# Patient Record
Sex: FEMALE | Race: WHITE | NOT HISPANIC OR LATINO | Employment: OTHER | URBAN - METROPOLITAN AREA
[De-identification: names, ages, dates, MRNs, and addresses within clinical notes are randomized per-mention and may not be internally consistent; named-entity substitution may affect disease eponyms.]

---

## 2023-05-15 ENCOUNTER — OFFICE VISIT (OUTPATIENT)
Dept: PRIMARY CARE | Facility: CLINIC | Age: 73
End: 2023-05-15
Payer: MEDICARE

## 2023-05-15 VITALS — OXYGEN SATURATION: 97 % | SYSTOLIC BLOOD PRESSURE: 130 MMHG | HEART RATE: 68 BPM | DIASTOLIC BLOOD PRESSURE: 76 MMHG

## 2023-05-15 DIAGNOSIS — R42 VERTIGO: Primary | ICD-10-CM

## 2023-05-15 PROCEDURE — 1159F MED LIST DOCD IN RCRD: CPT | Performed by: INTERNAL MEDICINE

## 2023-05-15 PROCEDURE — 99213 OFFICE O/P EST LOW 20 MIN: CPT | Performed by: INTERNAL MEDICINE

## 2023-05-15 PROCEDURE — 1036F TOBACCO NON-USER: CPT | Performed by: INTERNAL MEDICINE

## 2023-05-15 PROCEDURE — 1157F ADVNC CARE PLAN IN RCRD: CPT | Performed by: INTERNAL MEDICINE

## 2023-05-15 RX ORDER — CALCIUM CARBONATE/VITAMIN D3 500 MG-10
1 TABLET,CHEWABLE ORAL DAILY
COMMUNITY
End: 2023-12-04 | Stop reason: WASHOUT

## 2023-05-15 RX ORDER — EVENING PRIMROSE OIL 500 MG
CAPSULE ORAL
COMMUNITY
Start: 2021-04-01

## 2023-05-15 RX ORDER — ANASTROZOLE 1 MG/1
1 TABLET ORAL DAILY
COMMUNITY
End: 2024-01-22 | Stop reason: SDUPTHER

## 2023-05-15 RX ORDER — ATORVASTATIN CALCIUM 10 MG/1
1 TABLET, FILM COATED ORAL DAILY
COMMUNITY
Start: 2019-07-10 | End: 2023-08-22

## 2023-05-15 RX ORDER — LACTOBACILLUS COMBINATION NO.4 3B CELL
CAPSULE ORAL
COMMUNITY
End: 2023-12-04 | Stop reason: WASHOUT

## 2023-05-15 RX ORDER — ESTRADIOL 0.1 MG/G
CREAM VAGINAL
COMMUNITY
Start: 2022-04-11 | End: 2023-12-04 | Stop reason: SDUPTHER

## 2023-05-15 RX ORDER — VIT C/E/ZN/COPPR/LUTEIN/ZEAXAN 250MG-90MG
CAPSULE ORAL
COMMUNITY

## 2023-05-15 ASSESSMENT — ENCOUNTER SYMPTOMS
OCCASIONAL FEELINGS OF UNSTEADINESS: 1
LOSS OF SENSATION IN FEET: 0
DEPRESSION: 0

## 2023-05-15 NOTE — PROGRESS NOTES
Subjective   Patient ID: Paulina Knutson is a 72 y.o. female who presents for Dizziness.    Patient is here after an episode of vertigo that she had over the weekend.  Patient was in her usual state when she had what sounds like a viral gastroenteritis.  She had intense nausea and vomiting as well as diarrhea.  The entire episode lasted about 3 hours.  During that episode though she became very lightheaded and unsteady on her feet and she felt that she was on the deck of a ship.  The episode resolved and the patient is actually felt quite well since that time.  She denies any headache double vision tinnitus or hearing loss.  Patient had no true room spinning.  Patient had another episode similar to this about 3 months ago and had 1 more episode about a year ago.  She feels otherwise well         Review of Systems    Objective   /76   Pulse 68   SpO2 97%     Physical Exam  HEENT ears are clear bilaterally  EOMI PERRLA no nystagmus noted.  Chest clear to A&P  Cardiac exam unremarkable.    Assessment/Plan   Problem List Items Addressed This Visit          Other    Vertigo - Primary     Episode of vertigo associated with a viral gastroenteritis all of which have resolved.  Patient is reassured no need for any additional imaging at this time.  Patient will call if symptoms do not improve or recur.

## 2023-05-15 NOTE — ASSESSMENT & PLAN NOTE
Episode of vertigo associated with a viral gastroenteritis all of which have resolved.  Patient is reassured no need for any additional imaging at this time.  Patient will call if symptoms do not improve or recur.

## 2023-05-31 ENCOUNTER — OFFICE VISIT (OUTPATIENT)
Dept: PRIMARY CARE | Facility: CLINIC | Age: 73
End: 2023-05-31
Payer: MEDICARE

## 2023-05-31 ENCOUNTER — LAB (OUTPATIENT)
Dept: LAB | Facility: LAB | Age: 73
End: 2023-05-31
Payer: MEDICARE

## 2023-05-31 VITALS — OXYGEN SATURATION: 96 % | SYSTOLIC BLOOD PRESSURE: 110 MMHG | DIASTOLIC BLOOD PRESSURE: 64 MMHG | HEART RATE: 72 BPM

## 2023-05-31 DIAGNOSIS — R11.15 CYCLICAL VOMITING SYNDROME NOT ASSOCIATED WITH MIGRAINE: ICD-10-CM

## 2023-05-31 DIAGNOSIS — R10.84 GENERALIZED ABDOMINAL PAIN: ICD-10-CM

## 2023-05-31 DIAGNOSIS — R10.84 GENERALIZED ABDOMINAL PAIN: Primary | ICD-10-CM

## 2023-05-31 LAB
ALANINE AMINOTRANSFERASE (SGPT) (U/L) IN SER/PLAS: 16 U/L (ref 7–45)
ALBUMIN (G/DL) IN SER/PLAS: 4.3 G/DL (ref 3.4–5)
ALKALINE PHOSPHATASE (U/L) IN SER/PLAS: 72 U/L (ref 33–136)
AMYLASE (U/L) IN SER/PLAS: 57 U/L (ref 29–103)
ANION GAP IN SER/PLAS: 13 MMOL/L (ref 10–20)
ASPARTATE AMINOTRANSFERASE (SGOT) (U/L) IN SER/PLAS: 22 U/L (ref 9–39)
BASOPHILS (10*3/UL) IN BLOOD BY AUTOMATED COUNT: 0.05 X10E9/L (ref 0–0.1)
BASOPHILS/100 LEUKOCYTES IN BLOOD BY AUTOMATED COUNT: 0.4 % (ref 0–2)
BILIRUBIN TOTAL (MG/DL) IN SER/PLAS: 0.7 MG/DL (ref 0–1.2)
CALCIUM (MG/DL) IN SER/PLAS: 9.9 MG/DL (ref 8.6–10.3)
CARBON DIOXIDE, TOTAL (MMOL/L) IN SER/PLAS: 31 MMOL/L (ref 21–32)
CHLORIDE (MMOL/L) IN SER/PLAS: 102 MMOL/L (ref 98–107)
CREATININE (MG/DL) IN SER/PLAS: 0.67 MG/DL (ref 0.5–1.05)
EOSINOPHILS (10*3/UL) IN BLOOD BY AUTOMATED COUNT: 0.01 X10E9/L (ref 0–0.4)
EOSINOPHILS/100 LEUKOCYTES IN BLOOD BY AUTOMATED COUNT: 0.1 % (ref 0–6)
ERYTHROCYTE DISTRIBUTION WIDTH (RATIO) BY AUTOMATED COUNT: 12.7 % (ref 11.5–14.5)
ERYTHROCYTE MEAN CORPUSCULAR HEMOGLOBIN CONCENTRATION (G/DL) BY AUTOMATED: 33.6 G/DL (ref 32–36)
ERYTHROCYTE MEAN CORPUSCULAR VOLUME (FL) BY AUTOMATED COUNT: 92 FL (ref 80–100)
ERYTHROCYTES (10*6/UL) IN BLOOD BY AUTOMATED COUNT: 4.8 X10E12/L (ref 4–5.2)
GFR FEMALE: >90 ML/MIN/1.73M2
GLUCOSE (MG/DL) IN SER/PLAS: 105 MG/DL (ref 74–99)
HEMATOCRIT (%) IN BLOOD BY AUTOMATED COUNT: 44.3 % (ref 36–46)
HEMOGLOBIN (G/DL) IN BLOOD: 14.9 G/DL (ref 12–16)
IMMATURE GRANULOCYTES/100 LEUKOCYTES IN BLOOD BY AUTOMATED COUNT: 0.7 % (ref 0–0.9)
LEUKOCYTES (10*3/UL) IN BLOOD BY AUTOMATED COUNT: 14.1 X10E9/L (ref 4.4–11.3)
LIPASE (U/L) IN SER/PLAS: 47 U/L (ref 9–82)
LYMPHOCYTES (10*3/UL) IN BLOOD BY AUTOMATED COUNT: 0.77 X10E9/L (ref 0.8–3)
LYMPHOCYTES/100 LEUKOCYTES IN BLOOD BY AUTOMATED COUNT: 5.5 % (ref 13–44)
MONOCYTES (10*3/UL) IN BLOOD BY AUTOMATED COUNT: 0.36 X10E9/L (ref 0.05–0.8)
MONOCYTES/100 LEUKOCYTES IN BLOOD BY AUTOMATED COUNT: 2.6 % (ref 2–10)
NEUTROPHILS (10*3/UL) IN BLOOD BY AUTOMATED COUNT: 12.81 X10E9/L (ref 1.6–5.5)
NEUTROPHILS/100 LEUKOCYTES IN BLOOD BY AUTOMATED COUNT: 90.7 % (ref 40–80)
PLATELETS (10*3/UL) IN BLOOD AUTOMATED COUNT: 224 X10E9/L (ref 150–450)
POTASSIUM (MMOL/L) IN SER/PLAS: 4.2 MMOL/L (ref 3.5–5.3)
PROTEIN TOTAL: 6.8 G/DL (ref 6.4–8.2)
SEDIMENTATION RATE, ERYTHROCYTE: 7 MM/H (ref 0–30)
SODIUM (MMOL/L) IN SER/PLAS: 142 MMOL/L (ref 136–145)
UREA NITROGEN (MG/DL) IN SER/PLAS: 17 MG/DL (ref 6–23)
URINE CULTURE: NORMAL

## 2023-05-31 PROCEDURE — 36415 COLL VENOUS BLD VENIPUNCTURE: CPT

## 2023-05-31 PROCEDURE — 82150 ASSAY OF AMYLASE: CPT

## 2023-05-31 PROCEDURE — 1036F TOBACCO NON-USER: CPT | Performed by: INTERNAL MEDICINE

## 2023-05-31 PROCEDURE — 1157F ADVNC CARE PLAN IN RCRD: CPT | Performed by: INTERNAL MEDICINE

## 2023-05-31 PROCEDURE — 83690 ASSAY OF LIPASE: CPT

## 2023-05-31 PROCEDURE — 1159F MED LIST DOCD IN RCRD: CPT | Performed by: INTERNAL MEDICINE

## 2023-05-31 PROCEDURE — 80053 COMPREHEN METABOLIC PANEL: CPT

## 2023-05-31 PROCEDURE — 85652 RBC SED RATE AUTOMATED: CPT

## 2023-05-31 PROCEDURE — 99214 OFFICE O/P EST MOD 30 MIN: CPT | Performed by: INTERNAL MEDICINE

## 2023-05-31 PROCEDURE — 85025 COMPLETE CBC W/AUTO DIFF WBC: CPT

## 2023-05-31 NOTE — PROGRESS NOTES
Subjective   Patient ID: Paulina Knutson is a 72 y.o. female who presents for Dizziness.    Patient is here having recently suffered another episode of dizziness associated with nausea vomiting and loose stools.  This is now the fourth episode the patient had in the last 5 to 6 weeks.  Each episode begins with a feeling of vertigo.  Those symptoms lasted for few minutes and are often also associated with some sensation of having to go to the bathroom and have a bowel movement.  The patient has never had any syncopal or presyncopal symptoms.  She has no flushing or diaphoresis.  The spells last for few minutes and then she eventually gets home and get into bed where she then has episodes of vomiting as well as frequent defecation without true diarrhea.  She also has fairly significant abdominal pain associated with the vomiting.  The episodes last for approximately 2 to 3 hours and then resolves completely.  The patient then feels completely fine and is back to her baseline.  She has no residual does not feel tired or flushed and was able to go out to dinner the night after the episodes.  Patient has no other intervening symptoms when she is not acutely ill.  She has no associated fever chills night sweats or weight loss.  She has not no new medication.  She takes Arimidex and atorvastatin which has been on for years.  She has had no interaction with anybody else who is ill.  She has no history of migraine headache  She has no history of abdominal pain flushing or palpitations.           Review of Systems    Objective   /64   Pulse 72   SpO2 96%     Physical Exam  Abdominal:      General: Abdomen is flat. Bowel sounds are normal. There is no distension.      Palpations: Abdomen is soft. There is no mass.      Tenderness: There is no abdominal tenderness. There is guarding. There is no rebound.      Hernia: No hernia is present.         Assessment/Plan   Problem List Items Addressed This Visit           Nervous    Generalized abdominal pain - Primary    Relevant Orders    CBC and Auto Differential    Amylase    Comprehensive Metabolic Panel    Sedimentation Rate    Lipase       Digestive    Cyclical vomiting syndrome not associated with migraine     Unusual syndrome of cyclic nausea vomiting and loose stool associated with some vertigo.  Patient does not appear to have abdominal migraine but cannot rule out the possibility of some type of cyclic vomiting syndrome versus some other GI process or food intolerance.  We will obtain a CBC sed rate Panel amylase and lipase and then pursue further work-up as needed.  Nothing to suggest flushing or palpitations to consider carcinoid syndrome  No history of lead exposure or arsenic exposure

## 2023-05-31 NOTE — ASSESSMENT & PLAN NOTE
Unusual syndrome of cyclic nausea vomiting and loose stool associated with some vertigo.  Patient does not appear to have abdominal migraine but cannot rule out the possibility of some type of cyclic vomiting syndrome versus some other GI process or food intolerance.  We will obtain a CBC sed rate Panel amylase and lipase and then pursue further work-up as needed.  Nothing to suggest flushing or palpitations to consider carcinoid syndrome  No history of lead exposure or arsenic exposure

## 2023-07-06 ENCOUNTER — LAB (OUTPATIENT)
Dept: LAB | Facility: LAB | Age: 73
End: 2023-07-06
Payer: MEDICARE

## 2023-07-06 DIAGNOSIS — N30.00 ACUTE CYSTITIS WITHOUT HEMATURIA: Primary | ICD-10-CM

## 2023-07-06 DIAGNOSIS — R35.0 URINARY FREQUENCY: ICD-10-CM

## 2023-07-06 LAB
APPEARANCE, URINE: ABNORMAL
BILIRUBIN, URINE: NEGATIVE
BLOOD, URINE: ABNORMAL
COLOR, URINE: YELLOW
GLUCOSE, URINE: NEGATIVE MG/DL
KETONES, URINE: NEGATIVE MG/DL
LEUKOCYTE ESTERASE, URINE: ABNORMAL
NITRITE, URINE: NEGATIVE
PH, URINE: 6 (ref 5–8)
PROTEIN, URINE: ABNORMAL MG/DL
RBC, URINE: 108 /HPF (ref 0–5)
SPECIFIC GRAVITY, URINE: 1.01 (ref 1–1.03)
UROBILINOGEN, URINE: <2 MG/DL (ref 0–1.9)
WBC CLUMPS, URINE: ABNORMAL /HPF
WBC, URINE: 3353 /HPF (ref 0–5)

## 2023-07-06 PROCEDURE — 87077 CULTURE AEROBIC IDENTIFY: CPT

## 2023-07-06 PROCEDURE — 87086 URINE CULTURE/COLONY COUNT: CPT

## 2023-07-06 PROCEDURE — 81001 URINALYSIS AUTO W/SCOPE: CPT

## 2023-07-06 PROCEDURE — 87186 SC STD MICRODIL/AGAR DIL: CPT

## 2023-07-06 RX ORDER — SULFAMETHOXAZOLE AND TRIMETHOPRIM 800; 160 MG/1; MG/1
1 TABLET ORAL 2 TIMES DAILY
Qty: 6 TABLET | Refills: 0 | Status: SHIPPED | OUTPATIENT
Start: 2023-07-06 | End: 2023-07-09

## 2023-07-08 LAB — URINE CULTURE: ABNORMAL

## 2023-08-22 DIAGNOSIS — E78.2 MIXED HYPERLIPIDEMIA: Primary | ICD-10-CM

## 2023-08-22 RX ORDER — ATORVASTATIN CALCIUM 10 MG/1
10 TABLET, FILM COATED ORAL DAILY
Qty: 90 TABLET | Refills: 1 | Status: SHIPPED | OUTPATIENT
Start: 2023-08-22 | End: 2024-01-23 | Stop reason: SDUPTHER

## 2023-09-01 LAB
ALLERGEN FOOD: FISH (COD) GADUS MORHUA) IGE (KU/L): <0.1 KU/L
ALLERGEN FOOD: LOBSTER (HOMARUS GAMMARUS) IGE (KU/L): <0.1 KU/L
ALLERGEN FOOD: SALMON (SALMO SALAR) IGE (KU/L): <0.1 KU/L
ALLERGEN FOOD: SCALLOP (PECTEN SPP.) IGE (KU/L): <0.1 KU/L
ALLERGEN FOOD: SHRIMP (P. BOREALIS/MONODON, M. BARBATA/JOYNERI) IGE (KU/L): <0.1 KU/L
IMMUNOCAP INTERPRETATION: NORMAL

## 2023-09-04 LAB
IMMUNOCAP INTERPRETATION (ARUP): NORMAL
Lab: <0.1 KU/L

## 2023-09-06 LAB — TRYPTASE: 6.3 MCG/L

## 2023-09-08 LAB — F065 PERCH IGE: <0.35 KU/L

## 2023-11-16 DIAGNOSIS — Z00.00 ANNUAL PHYSICAL EXAM: ICD-10-CM

## 2023-11-17 PROBLEM — M81.8 DISUSE OSTEOPOROSIS: Status: ACTIVE | Noted: 2023-11-17

## 2023-11-17 PROBLEM — D22.70 MELANOCYTIC NEVI OF UNSPECIFIED LOWER LIMB, INCLUDING HIP: Status: ACTIVE | Noted: 2023-08-07

## 2023-11-17 PROBLEM — D48.5 NEOPLASM OF UNCERTAIN BEHAVIOR OF SKIN: Status: ACTIVE | Noted: 2023-08-07

## 2023-11-17 PROBLEM — L90.5 SCAR CONDITION AND FIBROSIS OF SKIN: Status: ACTIVE | Noted: 2023-08-07

## 2023-11-17 PROBLEM — D22.60 MELANOCYTIC NEVI OF UNSPECIFIED UPPER LIMB, INCLUDING SHOULDER: Status: ACTIVE | Noted: 2023-08-07

## 2023-11-17 PROBLEM — H91.20 SUDDEN IDIOPATHIC HEARING LOSS: Status: ACTIVE | Noted: 2022-08-11

## 2023-11-17 PROBLEM — R30.0 DYSURIA: Status: ACTIVE | Noted: 2023-11-17

## 2023-11-17 PROBLEM — D22.39 MELANOCYTIC NEVI OF OTHER PARTS OF FACE: Status: ACTIVE | Noted: 2023-08-07

## 2023-11-17 PROBLEM — H90.3 ASYMMETRICAL SENSORINEURAL HEARING LOSS: Status: ACTIVE | Noted: 2021-12-21

## 2023-11-17 PROBLEM — R92.8 ABNORMAL MAMMOGRAM: Status: ACTIVE | Noted: 2023-11-17

## 2023-11-17 PROBLEM — C50.912 BREAST CANCER, LEFT (MULTI): Status: ACTIVE | Noted: 2023-11-17

## 2023-11-17 PROBLEM — J02.9 SORE THROAT: Status: ACTIVE | Noted: 2023-11-17

## 2023-11-17 PROBLEM — D22.5 MELANOCYTIC NEVI OF TRUNK: Status: ACTIVE | Noted: 2023-08-07

## 2023-11-17 PROBLEM — J06.9 ACUTE URI: Status: ACTIVE | Noted: 2023-11-17

## 2023-11-17 PROBLEM — M85.80 OSTEOPENIA: Status: ACTIVE | Noted: 2023-11-17

## 2023-11-17 PROBLEM — N39.0 RECURRENT UTI: Status: ACTIVE | Noted: 2023-11-17

## 2023-11-17 PROBLEM — R39.9 URINARY SYMPTOM OR SIGN: Status: ACTIVE | Noted: 2023-11-17

## 2023-11-17 PROBLEM — N39.0 URINARY TRACT INFECTION: Status: ACTIVE | Noted: 2023-11-17

## 2023-11-17 PROBLEM — G93.9 BRAIN STEM LESION: Status: ACTIVE | Noted: 2023-11-17

## 2023-11-17 PROBLEM — H90.3 SENSORINEURAL HEARING LOSS, BILATERAL: Status: ACTIVE | Noted: 2021-12-21

## 2023-11-17 PROBLEM — N95.2 ATROPHY OF VAGINA: Status: ACTIVE | Noted: 2023-11-17

## 2023-11-17 PROBLEM — A49.9 BACTERIAL UTI: Status: ACTIVE | Noted: 2023-11-17

## 2023-11-17 PROBLEM — E78.5 HYPERLIPIDEMIA: Status: ACTIVE | Noted: 2023-11-17

## 2023-11-17 PROBLEM — D18.01 HEMANGIOMA OF SKIN AND SUBCUTANEOUS TISSUE: Status: ACTIVE | Noted: 2023-08-07

## 2023-11-17 PROBLEM — C50.919 INVASIVE DUCTAL CARCINOMA OF BREAST (MULTI): Status: ACTIVE | Noted: 2023-11-17

## 2023-11-17 PROBLEM — N63.20 LEFT BREAST MASS: Status: ACTIVE | Noted: 2023-11-17

## 2023-11-17 PROBLEM — R92.30 DENSE BREAST TISSUE: Status: ACTIVE | Noted: 2023-11-17

## 2023-11-17 PROBLEM — D23.72 OTHER BENIGN NEOPLASM OF SKIN OF LEFT LOWER LIMB, INCLUDING HIP: Status: ACTIVE | Noted: 2023-08-07

## 2023-11-17 PROBLEM — H93.299 IMPAIRED AUDITORY DISCRIMINATION: Status: ACTIVE | Noted: 2021-12-21

## 2023-11-17 PROBLEM — D12.6 COLON ADENOMA: Status: ACTIVE | Noted: 2023-11-17

## 2023-11-17 PROBLEM — L81.4 OTHER MELANIN HYPERPIGMENTATION: Status: ACTIVE | Noted: 2023-08-07

## 2023-11-17 PROBLEM — N39.0 BACTERIAL UTI: Status: ACTIVE | Noted: 2023-11-17

## 2023-11-17 PROBLEM — E78.01 FAMILIAL HYPERCHOLESTEREMIA: Status: ACTIVE | Noted: 2023-11-17

## 2023-11-17 PROBLEM — N64.89 SEROMA OF BREAST: Status: ACTIVE | Noted: 2023-11-17

## 2023-11-17 PROBLEM — D23.9 OTHER BENIGN NEOPLASM OF SKIN, UNSPECIFIED: Status: ACTIVE | Noted: 2023-08-07

## 2023-11-17 PROBLEM — L82.1 OTHER SEBORRHEIC KERATOSIS: Status: ACTIVE | Noted: 2023-08-07

## 2023-11-17 PROBLEM — R82.90 ABNORMAL URINE FINDINGS: Status: ACTIVE | Noted: 2023-11-17

## 2023-11-17 PROBLEM — R93.0 ABNORMAL MAGNETIC RESONANCE IMAGING OF HEAD: Status: ACTIVE | Noted: 2022-01-10

## 2023-11-17 PROBLEM — L73.8 OTHER SPECIFIED FOLLICULAR DISORDERS: Status: ACTIVE | Noted: 2023-08-07

## 2023-11-17 PROBLEM — Z90.12 STATUS POST PARTIAL MASTECTOMY OF LEFT BREAST: Status: ACTIVE | Noted: 2023-11-17

## 2023-11-17 PROBLEM — Z98.890 STATUS POST LEFT BREAST LUMPECTOMY: Status: ACTIVE | Noted: 2023-11-17

## 2023-11-17 RX ORDER — CEPHALEXIN 250 MG/1
250 CAPSULE ORAL
COMMUNITY
End: 2023-12-04 | Stop reason: WASHOUT

## 2023-11-17 RX ORDER — SULFAMETHOXAZOLE AND TRIMETHOPRIM 800; 160 MG/1; MG/1
1 TABLET ORAL EVERY 12 HOURS
COMMUNITY
End: 2023-12-04 | Stop reason: WASHOUT

## 2023-11-17 RX ORDER — ANASTROZOLE 1 MG/1
1 TABLET ORAL DAILY
COMMUNITY
End: 2023-12-04 | Stop reason: WASHOUT

## 2023-11-17 RX ORDER — PREDNISONE 5 MG/1
5 TABLET ORAL DAILY
COMMUNITY
End: 2023-12-04 | Stop reason: WASHOUT

## 2023-11-17 RX ORDER — CIPROFLOXACIN 250 MG/1
250 TABLET, FILM COATED ORAL 2 TIMES DAILY
COMMUNITY
End: 2023-12-04 | Stop reason: WASHOUT

## 2023-11-17 RX ORDER — NITROFURANTOIN 25; 75 MG/1; MG/1
1 CAPSULE ORAL
COMMUNITY
End: 2023-12-04 | Stop reason: WASHOUT

## 2023-11-17 RX ORDER — NIRMATRELVIR AND RITONAVIR 300-100 MG
KIT ORAL
COMMUNITY
End: 2023-12-04 | Stop reason: WASHOUT

## 2023-11-17 RX ORDER — AMOXICILLIN AND CLAVULANATE POTASSIUM 875; 125 MG/1; MG/1
1 TABLET, FILM COATED ORAL 2 TIMES DAILY
COMMUNITY
End: 2023-12-04 | Stop reason: WASHOUT

## 2023-11-17 RX ORDER — POLYETHYLENE GLYCOL 3350, SODIUM CHLORIDE, SODIUM BICARBONATE, POTASSIUM CHLORIDE 420; 11.2; 5.72; 1.48 G/4L; G/4L; G/4L; G/4L
4000 POWDER, FOR SOLUTION ORAL ONCE
COMMUNITY
End: 2023-12-04 | Stop reason: WASHOUT

## 2023-11-17 RX ORDER — EPINEPHRINE 0.3 MG/.3ML
1 INJECTION SUBCUTANEOUS ONCE AS NEEDED
COMMUNITY
Start: 2023-08-31

## 2023-11-17 RX ORDER — CIPROFLOXACIN 500 MG/1
500 TABLET ORAL 2 TIMES DAILY
COMMUNITY
End: 2023-12-04 | Stop reason: WASHOUT

## 2023-11-21 ENCOUNTER — CLINICAL SUPPORT (OUTPATIENT)
Dept: AUDIOLOGY | Facility: CLINIC | Age: 73
End: 2023-11-21
Payer: MEDICARE

## 2023-11-21 DIAGNOSIS — H90.A21 SENSORINEURAL HEARING LOSS (SNHL) OF RIGHT EAR WITH RESTRICTED HEARING OF LEFT EAR: Primary | ICD-10-CM

## 2023-11-27 ENCOUNTER — LAB (OUTPATIENT)
Dept: LAB | Facility: LAB | Age: 73
End: 2023-11-27
Payer: MEDICARE

## 2023-11-27 DIAGNOSIS — Z00.00 ANNUAL PHYSICAL EXAM: ICD-10-CM

## 2023-11-27 LAB
25(OH)D3 SERPL-MCNC: 39 NG/ML (ref 30–100)
ALBUMIN SERPL BCP-MCNC: 4.2 G/DL (ref 3.4–5)
ALP SERPL-CCNC: 70 U/L (ref 33–136)
ALT SERPL W P-5'-P-CCNC: 11 U/L (ref 7–45)
ANION GAP SERPL CALC-SCNC: 12 MMOL/L (ref 10–20)
APPEARANCE UR: ABNORMAL
AST SERPL W P-5'-P-CCNC: 18 U/L (ref 9–39)
BASOPHILS # BLD AUTO: 0.06 X10*3/UL (ref 0–0.1)
BASOPHILS NFR BLD AUTO: 1 %
BILIRUB SERPL-MCNC: 0.9 MG/DL (ref 0–1.2)
BILIRUB UR STRIP.AUTO-MCNC: NEGATIVE MG/DL
BUN SERPL-MCNC: 20 MG/DL (ref 6–23)
CALCIUM SERPL-MCNC: 9.8 MG/DL (ref 8.6–10.6)
CHLORIDE SERPL-SCNC: 106 MMOL/L (ref 98–107)
CHOLEST SERPL-MCNC: 149 MG/DL (ref 0–199)
CHOLESTEROL/HDL RATIO: 2.8
CO2 SERPL-SCNC: 32 MMOL/L (ref 21–32)
COLOR UR: YELLOW
CREAT SERPL-MCNC: 0.84 MG/DL (ref 0.5–1.05)
EOSINOPHIL # BLD AUTO: 0.1 X10*3/UL (ref 0–0.4)
EOSINOPHIL NFR BLD AUTO: 1.7 %
ERYTHROCYTE [DISTWIDTH] IN BLOOD BY AUTOMATED COUNT: 12.7 % (ref 11.5–14.5)
EST. AVERAGE GLUCOSE BLD GHB EST-MCNC: 108 MG/DL
GFR SERPL CREATININE-BSD FRML MDRD: 73 ML/MIN/1.73M*2
GLUCOSE SERPL-MCNC: 105 MG/DL (ref 74–99)
GLUCOSE UR STRIP.AUTO-MCNC: NEGATIVE MG/DL
HBA1C MFR BLD: 5.4 %
HCT VFR BLD AUTO: 45.5 % (ref 36–46)
HDLC SERPL-MCNC: 53.3 MG/DL
HGB BLD-MCNC: 14.7 G/DL (ref 12–16)
IMM GRANULOCYTES # BLD AUTO: 0.01 X10*3/UL (ref 0–0.5)
IMM GRANULOCYTES NFR BLD AUTO: 0.2 % (ref 0–0.9)
KETONES UR STRIP.AUTO-MCNC: NEGATIVE MG/DL
LDLC SERPL CALC-MCNC: 76 MG/DL
LEUKOCYTE ESTERASE UR QL STRIP.AUTO: ABNORMAL
LYMPHOCYTES # BLD AUTO: 1.55 X10*3/UL (ref 0.8–3)
LYMPHOCYTES NFR BLD AUTO: 26.9 %
MCH RBC QN AUTO: 32.5 PG (ref 26–34)
MCHC RBC AUTO-ENTMCNC: 32.3 G/DL (ref 32–36)
MCV RBC AUTO: 100 FL (ref 80–100)
MONOCYTES # BLD AUTO: 0.5 X10*3/UL (ref 0.05–0.8)
MONOCYTES NFR BLD AUTO: 8.7 %
MUCOUS THREADS #/AREA URNS AUTO: NORMAL /LPF
NEUTROPHILS # BLD AUTO: 3.55 X10*3/UL (ref 1.6–5.5)
NEUTROPHILS NFR BLD AUTO: 61.5 %
NITRITE UR QL STRIP.AUTO: NEGATIVE
NON HDL CHOLESTEROL: 96 MG/DL (ref 0–149)
NRBC BLD-RTO: 0 /100 WBCS (ref 0–0)
PH UR STRIP.AUTO: 6 [PH]
PLATELET # BLD AUTO: 211 X10*3/UL (ref 150–450)
POTASSIUM SERPL-SCNC: 4.5 MMOL/L (ref 3.5–5.3)
PROT SERPL-MCNC: 6.1 G/DL (ref 6.4–8.2)
PROT UR STRIP.AUTO-MCNC: NEGATIVE MG/DL
RBC # BLD AUTO: 4.53 X10*6/UL (ref 4–5.2)
RBC # UR STRIP.AUTO: NEGATIVE /UL
RBC #/AREA URNS AUTO: NORMAL /HPF
SODIUM SERPL-SCNC: 145 MMOL/L (ref 136–145)
SP GR UR STRIP.AUTO: 1.01
SQUAMOUS #/AREA URNS AUTO: NORMAL /HPF
T4 FREE SERPL-MCNC: 0.91 NG/DL (ref 0.78–1.48)
TRIGL SERPL-MCNC: 99 MG/DL (ref 0–149)
TSH SERPL-ACNC: 4.17 MIU/L (ref 0.44–3.98)
UROBILINOGEN UR STRIP.AUTO-MCNC: <2 MG/DL
VLDL: 20 MG/DL (ref 0–40)
WBC # BLD AUTO: 5.8 X10*3/UL (ref 4.4–11.3)
WBC #/AREA URNS AUTO: NORMAL /HPF

## 2023-11-27 PROCEDURE — 36415 COLL VENOUS BLD VENIPUNCTURE: CPT

## 2023-11-27 PROCEDURE — 80061 LIPID PANEL: CPT

## 2023-11-27 PROCEDURE — 83036 HEMOGLOBIN GLYCOSYLATED A1C: CPT

## 2023-11-27 PROCEDURE — 84443 ASSAY THYROID STIM HORMONE: CPT

## 2023-11-27 PROCEDURE — 85025 COMPLETE CBC W/AUTO DIFF WBC: CPT

## 2023-11-27 PROCEDURE — 82306 VITAMIN D 25 HYDROXY: CPT

## 2023-11-27 PROCEDURE — 84439 ASSAY OF FREE THYROXINE: CPT

## 2023-11-27 PROCEDURE — 80053 COMPREHEN METABOLIC PANEL: CPT

## 2023-11-27 PROCEDURE — 81001 URINALYSIS AUTO W/SCOPE: CPT

## 2023-11-30 NOTE — PROGRESS NOTES
HEARING AID CHECK    Name:  Paulina Knutson  :  1950  Age:  73 y.o.    HEARING AID INFORMATION:    Right Hearing Aid  Oticon CROS PX miniRITE-R  SN: [id number]  Warranty: 2/10/2025  Left Hearing Aid  Oticon More 1 miniRITE-R  SN: [id number]  Warranty: 2/10/2025    SN: [id number]  ConnectClip  SN: [id number]  Warranty: 2024   Length   Right: 1(60)  Left: 1(85)  Dome/Earmold  Right: 6 mm open bass  Left: 6 mm open bass  Wax Filter  ProWax miniFIT  Battery Size  Rechargeable  TLC Expiration:  2/10/2026    HISTORY:    Patient was seen for a check of the above devices.  She was having difficulty connecting to the  chapis, since the Oticon on chapis has been discontinued.  She also had some difficulty with the connect clip.    EXAM/PROCEDURES:    Cleaned and checked hearing aids.  Vacuumed davi ports and  ports. Cleaned battery contacts. Placed in aurovac drying chamber. Replaced wax filters and domes. Final listening check indicated adequate sound quality and function with no distortion of internal feedback.  Connected hearing aids to fitting software.  Completed firmware update.    Successfully unpaired and repaired hearing aid to patient's phone and Oticon  chapis.  Reviewed with patient features of the chapis and similarities/differences from the Oticon on chapis.  Reviewed with patient how to use the connect clip, as well as using the remote microphone feature through the chapis.  Both cannot be used simultaneously, but she may use the remote microphone feature if the connect clip is out of battery or she does not have it with her.    Patient also inquired about any updates in technology.  Informed her that there is a newer generation (Real), which is compatible with her CROS hearing aid so she would only have to update the left hearing aid side.  Offered to patient letting her demo a Real device at a future appointment. She stated she would think about it.    RECOMMENDATIONS  AND FOLLOW-UP:    - Continue use of amplification and follow-up as recommended for hearing aid maintenance and adjustments.  - Recommended 6-month follow-up HAC. Patient to contact the office sooner if problems arise.  - Monitor and recheck hearing as warranted.  - Counseled regarding results and recommendations.      Marah Palmer  Clinical Audiologist

## 2023-12-04 ENCOUNTER — OFFICE VISIT (OUTPATIENT)
Dept: PRIMARY CARE | Facility: CLINIC | Age: 73
End: 2023-12-04
Payer: MEDICARE

## 2023-12-04 ENCOUNTER — OFFICE VISIT (OUTPATIENT)
Dept: OBSTETRICS AND GYNECOLOGY | Facility: CLINIC | Age: 73
End: 2023-12-04
Payer: MEDICARE

## 2023-12-04 VITALS
BODY MASS INDEX: 20.02 KG/M2 | HEIGHT: 65 IN | SYSTOLIC BLOOD PRESSURE: 130 MMHG | DIASTOLIC BLOOD PRESSURE: 86 MMHG | OXYGEN SATURATION: 98 % | HEART RATE: 58 BPM | WEIGHT: 120.15 LBS

## 2023-12-04 VITALS
BODY MASS INDEX: 19.18 KG/M2 | HEART RATE: 67 BPM | HEIGHT: 66 IN | WEIGHT: 119.36 LBS | DIASTOLIC BLOOD PRESSURE: 81 MMHG | SYSTOLIC BLOOD PRESSURE: 144 MMHG

## 2023-12-04 DIAGNOSIS — N39.0 ACUTE LOWER UTI: ICD-10-CM

## 2023-12-04 DIAGNOSIS — C50.912 MALIGNANT NEOPLASM OF LEFT BREAST IN FEMALE, ESTROGEN RECEPTOR POSITIVE, UNSPECIFIED SITE OF BREAST (MULTI): ICD-10-CM

## 2023-12-04 DIAGNOSIS — N39.0 RECURRENT UTI (URINARY TRACT INFECTION): Primary | ICD-10-CM

## 2023-12-04 DIAGNOSIS — Z17.0 MALIGNANT NEOPLASM OF LEFT BREAST IN FEMALE, ESTROGEN RECEPTOR POSITIVE, UNSPECIFIED SITE OF BREAST (MULTI): ICD-10-CM

## 2023-12-04 DIAGNOSIS — N95.2 VAGINAL ATROPHY: ICD-10-CM

## 2023-12-04 DIAGNOSIS — G93.9 BRAIN STEM LESION: ICD-10-CM

## 2023-12-04 DIAGNOSIS — H90.3 SENSORINEURAL HEARING LOSS, BILATERAL: ICD-10-CM

## 2023-12-04 DIAGNOSIS — E78.01 FAMILIAL HYPERCHOLESTEREMIA: Primary | ICD-10-CM

## 2023-12-04 DIAGNOSIS — M81.8 DISUSE OSTEOPOROSIS: ICD-10-CM

## 2023-12-04 PROCEDURE — 99214 OFFICE O/P EST MOD 30 MIN: CPT | Mod: PO | Performed by: OBSTETRICS & GYNECOLOGY

## 2023-12-04 PROCEDURE — 1126F AMNT PAIN NOTED NONE PRSNT: CPT | Performed by: INTERNAL MEDICINE

## 2023-12-04 PROCEDURE — 1159F MED LIST DOCD IN RCRD: CPT | Performed by: OBSTETRICS & GYNECOLOGY

## 2023-12-04 PROCEDURE — 1036F TOBACCO NON-USER: CPT | Performed by: OBSTETRICS & GYNECOLOGY

## 2023-12-04 PROCEDURE — 1126F AMNT PAIN NOTED NONE PRSNT: CPT | Performed by: OBSTETRICS & GYNECOLOGY

## 2023-12-04 PROCEDURE — 1036F TOBACCO NON-USER: CPT | Performed by: INTERNAL MEDICINE

## 2023-12-04 PROCEDURE — 81003 URINALYSIS AUTO W/O SCOPE: CPT | Performed by: OBSTETRICS & GYNECOLOGY

## 2023-12-04 PROCEDURE — 51798 US URINE CAPACITY MEASURE: CPT | Mod: PO | Performed by: OBSTETRICS & GYNECOLOGY

## 2023-12-04 PROCEDURE — 1159F MED LIST DOCD IN RCRD: CPT | Performed by: INTERNAL MEDICINE

## 2023-12-04 PROCEDURE — UHSPHYS PR UH SELECT PHYSICAL: Performed by: INTERNAL MEDICINE

## 2023-12-04 PROCEDURE — 99214 OFFICE O/P EST MOD 30 MIN: CPT | Performed by: OBSTETRICS & GYNECOLOGY

## 2023-12-04 PROCEDURE — 51798 US URINE CAPACITY MEASURE: CPT | Performed by: OBSTETRICS & GYNECOLOGY

## 2023-12-04 RX ORDER — ESTRADIOL 0.1 MG/G
1 CREAM VAGINAL 2 TIMES WEEKLY
Qty: 42.5 G | Refills: 3 | Status: SHIPPED | OUTPATIENT
Start: 2023-12-04

## 2023-12-04 RX ORDER — SULFAMETHOXAZOLE AND TRIMETHOPRIM 800; 160 MG/1; MG/1
1 TABLET ORAL 2 TIMES DAILY
Qty: 14 TABLET | Refills: 1 | Status: SHIPPED | OUTPATIENT
Start: 2023-12-04 | End: 2023-12-04 | Stop reason: WASHOUT

## 2023-12-04 ASSESSMENT — ENCOUNTER SYMPTOMS
NERVOUS/ANXIOUS: 0
WHEEZING: 0
SHORTNESS OF BREATH: 0
CONFUSION: 0
HEADACHES: 0
ADENOPATHY: 0
FREQUENCY: 0
ABDOMINAL DISTENTION: 0
PALPITATIONS: 0
FACIAL SWELLING: 0
FEVER: 0
CHILLS: 0
MYALGIAS: 0
POLYDIPSIA: 0
DYSURIA: 0
SORE THROAT: 0
ABDOMINAL PAIN: 0
COUGH: 0
SINUS PRESSURE: 0
TROUBLE SWALLOWING: 0
SINUS PAIN: 0
LIGHT-HEADEDNESS: 0
FATIGUE: 0
NECK STIFFNESS: 0
BLOOD IN STOOL: 0
NUMBNESS: 0
DIARRHEA: 0
HEMATURIA: 0
RHINORRHEA: 0
UNEXPECTED WEIGHT CHANGE: 0
WEAKNESS: 0
HYPERACTIVE: 0
CONSTITUTIONAL NEGATIVE: 1
CHEST TIGHTNESS: 0
NAUSEA: 0
CONSTIPATION: 0
BRUISES/BLEEDS EASILY: 0
DIZZINESS: 0
SLEEP DISTURBANCE: 0
DYSPHORIC MOOD: 0
JOINT SWELLING: 0
ACTIVITY CHANGE: 0
VOMITING: 0
ARTHRALGIAS: 0
BACK PAIN: 0

## 2023-12-04 ASSESSMENT — PATIENT HEALTH QUESTIONNAIRE - PHQ9
2. FEELING DOWN, DEPRESSED OR HOPELESS: NOT AT ALL
SUM OF ALL RESPONSES TO PHQ9 QUESTIONS 1 AND 2: 0
1. LITTLE INTEREST OR PLEASURE IN DOING THINGS: NOT AT ALL

## 2023-12-04 ASSESSMENT — PAIN SCALES - GENERAL: PAINLEVEL: 0-NO PAIN

## 2023-12-04 NOTE — ASSESSMENT & PLAN NOTE
Repeat MRIs have demonstrated a benign vascular lesion in the asher no further work-up will be required.

## 2023-12-04 NOTE — ASSESSMENT & PLAN NOTE
Patient continues on calcium and vitamin D as well as anastrozole.  We will get a repeat bone density study to assess her current bone status.  Once again encouraged her to do more weight bearing exercise.

## 2023-12-04 NOTE — PROGRESS NOTES
Paulina Knutson       Patient is here for complete physical and a general checkup.  She is doing extremely well without any major complaints.  She is now 2 years status post left lumpectomy and radiation for stage I breast cancer.  She continues to follow-up with the breast team and she remains on anastrozole.  She recently got some new hearing aids.  She continues to exercise regularly does a lot of weight training and water exercise.  She is not doing anything in terms of balance or core strength.           Review of Systems   Constitutional: Negative.  Negative for activity change, chills, fatigue, fever and unexpected weight change.   HENT:  Positive for hearing loss. Negative for dental problem, ear pain, facial swelling, mouth sores, rhinorrhea, sinus pressure, sinus pain, sore throat, tinnitus and trouble swallowing.    Eyes:  Negative for visual disturbance.   Respiratory:  Negative for cough, chest tightness, shortness of breath and wheezing.    Cardiovascular:  Negative for chest pain, palpitations and leg swelling.   Gastrointestinal:  Negative for abdominal distention, abdominal pain, blood in stool, constipation (Mild   managed with Dried fruit), diarrhea, nausea and vomiting.   Endocrine: Negative for cold intolerance, heat intolerance, polydipsia and polyuria.   Genitourinary:  Negative for dysuria, frequency, hematuria and urgency.   Musculoskeletal:  Negative for arthralgias, back pain, joint swelling, myalgias and neck stiffness.   Skin:  Negative for rash.   Allergic/Immunologic: Negative for food allergies.   Neurological:  Negative for dizziness, weakness, light-headedness, numbness and headaches.   Hematological:  Negative for adenopathy. Does not bruise/bleed easily.   Psychiatric/Behavioral:  Negative for confusion, dysphoric mood and sleep disturbance. The patient is not nervous/anxious and is not hyperactive.           Objective      Visit Vitals  /86   Pulse 58   Ht 1.659 m (5'  "5.32\")   Wt 54.5 kg (120 lb 2.4 oz)   SpO2 98%   BMI 19.80 kg/m²   Smoking Status Never   BSA 1.58 m²        Physical Exam  Constitutional:       Appearance: Normal appearance. She is normal weight.   HENT:      Head: Normocephalic and atraumatic.      Right Ear: Tympanic membrane, ear canal and external ear normal.      Left Ear: Tympanic membrane, ear canal and external ear normal.      Nose: Nose normal.      Mouth/Throat:      Mouth: Mucous membranes are moist. No oral lesions.      Pharynx: Oropharynx is clear. Uvula midline. No oropharyngeal exudate or posterior oropharyngeal erythema.   Neck:      Thyroid: No thyroid mass or thyromegaly.      Vascular: No carotid bruit or JVD.   Cardiovascular:      Rate and Rhythm: Normal rate and regular rhythm.      Pulses: Normal pulses.           Carotid pulses are 2+ on the right side and 2+ on the left side.       Radial pulses are 2+ on the right side and 2+ on the left side.        Femoral pulses are 2+ on the right side and 2+ on the left side.       Popliteal pulses are 2+ on the right side and 2+ on the left side.        Dorsalis pedis pulses are 2+ on the right side and 2+ on the left side.        Posterior tibial pulses are 2+ on the right side and 2+ on the left side.      Heart sounds: Normal heart sounds, S1 normal and S2 normal. No murmur heard.  Pulmonary:      Effort: Pulmonary effort is normal.      Breath sounds: Normal breath sounds.   Chest:   Breasts:     Right: Normal. No mass or nipple discharge.      Left: Normal. No mass or nipple discharge.          Comments: Radiation markers on the left breast  Abdominal:      General: Abdomen is flat. Bowel sounds are normal.      Palpations: Abdomen is soft.      Tenderness: There is no abdominal tenderness.      Hernia: No hernia is present.   Musculoskeletal:         General: No swelling or tenderness. Normal range of motion.      Cervical back: Normal range of motion and neck supple. No rigidity.      " Right lower leg: No edema.      Left lower leg: No edema.   Lymphadenopathy:      Cervical: No cervical adenopathy.   Skin:     General: Skin is warm and dry.      Findings: No lesion or rash.   Neurological:      General: No focal deficit present.      Mental Status: She is alert and oriented to person, place, and time. Mental status is at baseline.   Psychiatric:         Mood and Affect: Mood normal.         Thought Content: Thought content normal.              Assess/Plan SmartLinks:   Problem List Items Addressed This Visit             ICD-10-CM    Sensorineural hearing loss, bilateral H90.3     New hearing aids now working quite well.         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  We will get a repeat bone density study to assess her current bone status.  Once again encouraged her to do more weight bearing exercise.         Familial hypercholesteremia - Primary E78.01     Lipids under excellent control on current doses of atorvastatin.        Patient is up-to-date on all vaccines  Patient up-to-date on colon cancer screening.

## 2023-12-04 NOTE — ASSESSMENT & PLAN NOTE
Patient now 2 years status post lumpectomy and radiation doing very well no evidence of recurrence she is follow-up with the breast surgery team every 6 months.

## 2023-12-04 NOTE — PROGRESS NOTES
Urogynecology  Provider:  Emma Quiroz MD  [phone]              ASSESSMENT AND PLAN:     Problem List Items Addressed This Visit    None          I spent a total of eConsult Time: 30 minutes in face to face and non face to face time.        Emma Quiroz MD  HISTORY OF PRESENT ILLNESS    Paulina romeo 73 y.o. here today for a follow up     Urinary symptoms:  -  Had one UTI since last visit  - Going on vacation to [place] and would like a PRN script of Bactrim  - Using estrogen twice a week     Urine culture  review:  - Escherichia coli (A) 2023    Screenings:  - Mammogram up to date             Recurrent UTI-- no recent UTI  Vaginal atrophy, not consistent with tv estrogen use    Past Medical History:   Diagnosis Date    Other conditions influencing health status     Urinary Tract Infection    Other conditions influencing health status     Foot pain, unspecified laterality    Personal history of urinary (tract) infections 11/10/2021    History of urinary tract infection          Past Surgical History:       Past Surgical History:   Procedure Laterality Date    BREAST BIOPSY  11/10/2021    Biopsy Breast Open     SECTION, CLASSIC  11/10/2021     Section    COLONOSCOPY  2021    Complete Colonoscopy    TONSILLECTOMY  2021    Tonsillectomy         Medications:       Prior to Admission medications    Medication Sig Start Date End Date Taking? Authorizing Provider   amoxicillin-pot clavulanate (Augmentin) 875-125 mg tablet Take 1 tablet (875 mg) by mouth 2 times a day.    Historical Provider, MD   anastrozole (Arimidex) 1 mg tablet Take 1 tablet (1 mg total) by mouth once daily.    Historical Provider, MD   anastrozole (Arimidex) 1 mg tablet Take 1 tablet (1 mg total) by mouth once daily.    Historical Provider, MD   atorvastatin (Lipitor) 10 mg tablet TAKE 1 TABLET BY MOUTH EVERY DAY 23   Nancy Aggarwal MD   calcium carbonate-vitamin D3 500 mg-10 mcg  (400 unit) chewable tablet Chew 1 tablet once daily.    Historical Provider, MD   cephalexin (Keflex) 250 mg capsule Take 1 capsule (250 mg) by mouth.    Historical Provider, MD   cholecalciferol (Vitamin D-3) 25 MCG (1000 UT) capsule Take by mouth.    Historical Provider, MD   ciprofloxacin (Cipro) 250 mg tablet Take 1 tablet (250 mg) by mouth 2 times a day.    Historical Provider, MD   ciprofloxacin (Cipro) 500 mg tablet Take 1 tablet (500 mg) by mouth 2 times a day.    Historical Provider, MD   EPINEPHrine 0.3 mg/0.3 mL injection syringe Inject 0.3 mL (0.3 mg) into the muscle 1 time if needed. 8/31/23   Historical Provider, MD   estradiol (Estrace) 0.1 MG/GM vaginal cream Insert into the vagina. 4/11/22   Historical Provider, MD   L. acidophilus/Bifid. animalis 32 billion cell capsule Take by mouth. 4/1/21   Jackie Provider, MD   lactobacillus combination no.4 (Probiotic) 3 billion cell capsule probiotic   Quantity: 0   Refills: 0   Ordered: 25-Jun-2021  Stacy Vigil Generic Substitution Allowed    Historical Provider, MD   nirmatrelvir-ritonavir (Paxlovid) 300 mg (150 mg x 2)-100 mg tablet therapy pack Take as directed    Historical Provider, MD   nitrofurantoin, macrocrystal-monohydrate, (Macrobid) 100 mg capsule Take 1 capsule (100 mg) by mouth 2 times a day with meals.    Historical Provider, MD   polyethylene glycol-electrolytes 420 gram solution Take 4,000 mL by mouth 1 time.    Historical Provider, MD   predniSONE (Deltasone) 5 mg tablet Take 1 tablet (5 mg) by mouth once daily.    Historical Provider, MD   sulfamethoxazole-trimethoprim (Bactrim DS) 800-160 mg tablet Take 1 tablet by mouth every 12 hours.    Historical Provider, MD   vitamin E 45 mg (100 unit) capsule Take by mouth. 4/1/21   Historical ProviderMD LOCK  Review of Systems     Blood, Urine   Date Value Ref Range Status   11/27/2023 NEGATIVE NEGATIVE Final     Nitrite, Urine   Date Value Ref Range Status   11/27/2023  NEGATIVE NEGATIVE Final     Urobilinogen, Urine   Date Value Ref Range Status   11/27/2023 <2.0 <2.0 mg/dL Final         PHYSICAL EXAM:      There were no vitals taken for this visit.     No LMP recorded.      Declines chaperone for physical exam.      Well developed, well nourished, in no apparent distress.   Neurologic/Psychiatric:  Awake, Alert and Oriented times 3.  Affect normal.     GENITAL/URINARY:       External Genitalia:  The patient has normal appearing external genitalia, normal skenes and bartholins glands, and a normal hair distribution.  Her vulva is without lesions, erythema or discharge.  It is non-tender with appropriate sensation.     Urethral Meatus:  Size normal, Location normal, Lesions absent, Prolapse absent,      Urethra:  Fullness absent, Masses absent, 2-3cm caruncle noted      Bladder:  Fullness absent, Masses absent, Tenderness absent,      Vagina:  General appearance normal, Estrogen effect normal, Discharge absent, Lesions absent     Cervix: Normal, no discharge.   Uterus:  normal size  Adnexa: normal     Anus/Perineum:  Lesions absent and Masses absent defer exam  Normal Perineum          POP-Q:  No prolapse noted      Lab Results   Component Value Date    URINECULTURE Escherichia coli (A) 07/06/2023    URINECULTURE CANCELED 05/31/2023         Lab Results   Component Value Date    GLUCOSE 105 (H) 11/27/2023    CALCIUM 9.8 11/27/2023     11/27/2023    K 4.5 11/27/2023    CO2 32 11/27/2023     11/27/2023    BUN 20 11/27/2023    CREATININE 0.84 11/27/2023     Lab Results   Component Value Date    WBC 5.8 11/27/2023    HGB 14.7 11/27/2023    HCT 45.5 11/27/2023     11/27/2023     11/27/2023    ASSESSMENT &PLAN     Assessment:   73 y.o. old female being assessed for Kodi's and urethral caruncle     Diagnoses:   #1 Kodi's  #2 Urethral caruncle     Plan:   Kodi's  - Continue use of TV estrogen cream twice a week   - Rx'd Bactrim 800MG PO PRN for patients travels   -  Refilled Estrace cream to be used twice a week     2. Urethral caruncle  - Advised patient to use estrogen cream around urethra when she uses her estrogen transvaginally twice a week   - Pt amendable to this     Follow-up in 6 months with Dr. Richie Marie Attestation  By signing my name below, I, Jewels Frank Plascencia   attest that this documentation has been prepared under the direction and in the presence of Emma Quiroz MD.       Agree with above   All questions answered   New urethral caruncle, Will tx with tv estrogen and better compliance  I Dr. Quiroz, personally performed the services described in the documentation which was scribed virtually and confirm it is both complete and accurate.  Emma Quiroz MD

## 2023-12-05 ENCOUNTER — ANCILLARY PROCEDURE (OUTPATIENT)
Dept: RADIOLOGY | Facility: CLINIC | Age: 73
End: 2023-12-05
Payer: MEDICARE

## 2023-12-05 DIAGNOSIS — Z78.0 ASYMPTOMATIC MENOPAUSAL STATE: ICD-10-CM

## 2023-12-05 DIAGNOSIS — Z51.81 ENCOUNTER FOR THERAPEUTIC DRUG LEVEL MONITORING: ICD-10-CM

## 2023-12-05 DIAGNOSIS — C50.912 MALIGNANT NEOPLASM OF UNSPECIFIED SITE OF LEFT FEMALE BREAST (MULTI): ICD-10-CM

## 2023-12-05 PROCEDURE — 77080 DXA BONE DENSITY AXIAL: CPT

## 2023-12-05 PROCEDURE — 77080 DXA BONE DENSITY AXIAL: CPT | Performed by: RADIOLOGY

## 2023-12-14 ENCOUNTER — TELEPHONE (OUTPATIENT)
Dept: HEMATOLOGY/ONCOLOGY | Facility: HOSPITAL | Age: 73
End: 2023-12-14
Payer: MEDICARE

## 2023-12-14 NOTE — TELEPHONE ENCOUNTER
Call to Paulina and  to review recent Bone density testing. We have reviewed the worsening of bone density to -2.4 from -2.0 July 2021. She is aware she is encroaching on osteoporosis. She would like to discuss this with Dr Granados her PCP.  We have reviewed the option of switching for the last 3 years of antiestrogen therapy to tamoxifen, considering bisphosphonate therapy with either Zometa or Prolia, or meeting with a rheumatologist for a more formal assessment of her bone density changes.  She will discuss with PCP, I have also offered to send him a message.  Patient will reach out to me with her decisions moving forward

## 2023-12-18 DIAGNOSIS — M81.8 DISUSE OSTEOPOROSIS: Primary | ICD-10-CM

## 2023-12-18 RX ORDER — ALENDRONATE SODIUM 70 MG/1
70 TABLET ORAL
Qty: 4 TABLET | Refills: 11 | Status: SHIPPED | OUTPATIENT
Start: 2023-12-18 | End: 2024-04-09 | Stop reason: ALTCHOICE

## 2024-01-19 ENCOUNTER — HOSPITAL ENCOUNTER (OUTPATIENT)
Dept: RADIATION ONCOLOGY | Facility: CLINIC | Age: 74
Setting detail: RADIATION/ONCOLOGY SERIES
Discharge: HOME | End: 2024-01-19
Payer: MEDICARE

## 2024-01-19 VITALS
WEIGHT: 118.83 LBS | OXYGEN SATURATION: 94 % | SYSTOLIC BLOOD PRESSURE: 137 MMHG | RESPIRATION RATE: 18 BRPM | HEART RATE: 63 BPM | TEMPERATURE: 97.9 F | BODY MASS INDEX: 19.58 KG/M2 | DIASTOLIC BLOOD PRESSURE: 86 MMHG

## 2024-01-19 DIAGNOSIS — Z90.12 STATUS POST PARTIAL MASTECTOMY OF LEFT BREAST: Primary | ICD-10-CM

## 2024-01-19 PROCEDURE — 99213 OFFICE O/P EST LOW 20 MIN: CPT | Performed by: NURSE PRACTITIONER

## 2024-01-19 ASSESSMENT — PAIN SCALES - GENERAL: PAINLEVEL: 0-NO PAIN

## 2024-01-19 NOTE — PROGRESS NOTES
Radiation Oncology Follow-Up    Patient Name:  Paulina Knutson  MRN:  [id number]  :  1950    Referring Provider: No ref. provider found  Primary Care Provider: Raúl Granados MD  Care Team: Patient Care Team:  Raúl Granados MD as PCP - General (Internal Medicine)    Date of Service: 2024        Cancer Staging:          Breast         AJCC Edition: 8th (AJCC), Diagnosis Date: May 2021, IA, pT1c pN0 cM0 G1     Treatment Synopsis:    74 yo female with  IA , pT1c pN0 cM0 G1 left breast cancer s/p lumpectomy with SLNBx followed by adjuvant radiation.      Treatment Summary:      -3/24/21: Bilateral screening mammogram revealed a focal asymmetry in the left breast.  -3/25/21: Dx MG/US confirmed a 0.5 x 0.6 x 0.4 cm irregular hypoechoic mass at 3:00, 7 cm FN. A small irregularly hypoechoic mass was seen at 2:00, 7 cm FN, possibly congruent with other lesion. Left axilla appeared unremarkable.  -21: US-guided CNB at 2:00 revealed IDC, grade 1-2, with associated microcalcifications; ER >95%, LA 65%, Her2-negative (1+ IHC). Mammaprint assay low-risk (+0.754), luminal A type.  -21: Left lumpectomy/SLNBx showed 1.8 cm of grade 1 IDC, no LVI, 0/2 SLN’s involved. Margins negative.  -21 - 21: treated with partial breast irradiation consisting of a total dose of 30 Gy given in 5 fractions of 6 Gy.   - initiated on anastrozole post treatment.      SUBJECTIVE  History of Present Illness:   Paulina Knutson is here today for routine radiation follow up.  She has been well and has had minimal skin effects from radiation. Skin reported as intact  and no issues. Denies any breast pain or palpable findings.  No swelling of left arm or difficulty with ROM.  She is taking anastrozole and no intolerable side effects. She continues taking daily calcium and Vit D supps. She is walking 2-3 miles daily and now working with a . Denies headaches, fever, chills, cough, SOB, chest pain, GI or   complaints or bony  pain.  Mammogram due in April. Recent DEXA - osteopenia.     Review of Systems:    Review of Systems   All other systems reviewed and are negative.    Performance Status:   The Karnofsky performance scale today is 100, Fully active, able to carry on all pre-disease performed without restriction (ECOG equivalent 0).      OBJECTIVE    Current Outpatient Medications:     alendronate (Fosamax) 70 mg tablet, Take 1 tablet (70 mg) by mouth every 7 days. Take in the morning with a full glass of water, on an empty stomach, and do not take anything else by mouth or lie down for the next 30 min., Disp: 4 tablet, Rfl: 11    anastrozole (Arimidex) 1 mg tablet, Take 1 tablet (1 mg total) by mouth once daily., Disp: , Rfl:     atorvastatin (Lipitor) 10 mg tablet, TAKE 1 TABLET BY MOUTH EVERY DAY, Disp: 90 tablet, Rfl: 1    cholecalciferol (Vitamin D-3) 25 MCG (1000 UT) capsule, Take by mouth., Disp: , Rfl:     EPINEPHrine 0.3 mg/0.3 mL injection syringe, Inject 0.3 mL (0.3 mg) into the muscle 1 time if needed., Disp: , Rfl:     estradiol (Estrace) 0.01 % (0.1 mg/gram) vaginal cream, Insert 0.25 Applicatorfuls (1 g) into the vagina 2 times a week., Disp: 42.5 g, Rfl: 3    L. acidophilus/Bifid. animalis 32 billion cell capsule, Take by mouth., Disp: , Rfl:     vitamin E 45 mg (100 unit) capsule, Take by mouth., Disp: , Rfl:      Physical Exam  Constitutional:       Appearance: Normal appearance.   HENT:      Head: Normocephalic and atraumatic.      Nose: Nose normal.      Mouth/Throat:      Mouth: Mucous membranes are moist.      Pharynx: Oropharynx is clear.   Eyes:      Conjunctiva/sclera: Conjunctivae normal.      Pupils: Pupils are equal, round, and reactive to light.   Cardiovascular:      Rate and Rhythm: Normal rate and regular rhythm.      Heart sounds: Normal heart sounds.   Pulmonary:      Effort: Pulmonary effort is normal.      Breath sounds: Normal breath sounds.   Chest:   Breasts:     Right:  Normal. No swelling, inverted nipple, mass, nipple discharge or skin change.      Left: Normal. No swelling, inverted nipple, mass, nipple discharge or skin change.   Abdominal:      General: Abdomen is flat.      Palpations: Abdomen is soft.   Musculoskeletal:         General: No swelling. Normal range of motion.      Cervical back: Normal range of motion and neck supple.   Lymphadenopathy:      Cervical: No cervical adenopathy.      Upper Body:      Right upper body: No supraclavicular or axillary adenopathy.      Left upper body: No supraclavicular or axillary adenopathy.   Skin:     General: Skin is warm and dry.   Neurological:      General: No focal deficit present.      Mental Status: She is alert and oriented to person, place, and time.   Psychiatric:         Mood and Affect: Mood normal.         Behavior: Behavior normal.         RESULTS:  Narrative & Impression   Interpreted By:  PETEY GARG MD  MRN: [id number]  Patient Name: NÉSTOR BARRON     STUDY:  DIGITAL MAMM SCREENING W/ JOSE ALBERTO;  4/10/2023 8:31 am     ACCESSION NUMBER(S):  [id number]     ORDERING CLINICIAN:  BETITO ANDRADE     INDICATION:  Screening. History of left lumpectomy with radiation therapy.     COMPARISON:  04/07/2022, 03/24/2021, 12/09/2019     FINDINGS:  2D and tomosynthesis images were reviewed at 1 mm slice thickness.     The breast tissue is heterogeneously dense, which may obscure small  masses.  Stable postsurgical changes in the upper outer left breast  at posterior depth and left axilla. No suspicious masses or  calcifications are identified.     IMPRESSION:  No mammographic evidence of malignancy. Stable left breast post  treatment changes.     BI-RADS CATEGORY:     Category: 2 - Benign.  Recommendation: Patient letter sent SNORM       ASSESSMENT/PLAN:  73 y.o. female with early stage low risk left breast cancer s/p breast conserving surgery followed by partial breast radiation.  She will have mammogram and follow  up with  Heike Madrid CNP in April.  Continues anastrozole and will follow up with Cara Saldivar CNP in August. Radiation follow up in 12 mo.  Navin with any concerns.     Juanis Sorensen CNP  [phone]

## 2024-01-22 DIAGNOSIS — C50.912 MALIGNANT NEOPLASM OF LEFT BREAST IN FEMALE, ESTROGEN RECEPTOR POSITIVE, UNSPECIFIED SITE OF BREAST (MULTI): Primary | ICD-10-CM

## 2024-01-22 DIAGNOSIS — Z17.0 MALIGNANT NEOPLASM OF LEFT BREAST IN FEMALE, ESTROGEN RECEPTOR POSITIVE, UNSPECIFIED SITE OF BREAST (MULTI): Primary | ICD-10-CM

## 2024-01-22 DIAGNOSIS — E78.2 MIXED HYPERLIPIDEMIA: ICD-10-CM

## 2024-01-22 RX ORDER — ANASTROZOLE 1 MG/1
1 TABLET ORAL DAILY
Qty: 90 TABLET | Refills: 1 | Status: SHIPPED | OUTPATIENT
Start: 2024-01-22 | End: 2024-06-11 | Stop reason: ALTCHOICE

## 2024-01-24 RX ORDER — ATORVASTATIN CALCIUM 10 MG/1
10 TABLET, FILM COATED ORAL DAILY
Qty: 90 TABLET | Refills: 3 | Status: SHIPPED | OUTPATIENT
Start: 2024-01-24 | End: 2024-06-10 | Stop reason: ALTCHOICE

## 2024-04-03 NOTE — PROGRESS NOTES
"Paulina Knutson female   1950 73 y.o.   [id number]      Chief Complaint  Annual mammogram and exam, history of left breast cancer    History Of Present Illness  Paulina (\"Mary\") Eamon is a very pleasant 73 year old [employer] female who is status post left breast magseed localized partial mastectomy, left axillary sentinel lymph node biopsy on 2021 for a screen detected left breast invasive ductal carcinoma, ER + >95%, IA + 65%, HER2 negative. Final pathology yielded invasive ductal carcinoma and focal ductal carcinoma in situ, 1.8 cm, negative margins, 0/2 lymph nodes. Mammaprint Low Risk, Luminal A-Type. She completed radiation and started Anastrozole, currently taking and tolerating well. She presents today for annual mammogram and exam. She is here with her , Raúl. She denies any new masses or lumps. She was started on Fosamax following bone density scan in 2023 demonstrating Osteopenia. She states she only took for one week and discontinued due to side effects with muscle pain. She was not placed on any other medication for Osteopenia. She states she takes Vitamin D and Calcium daily and is very active.   Stage IA [id number]     BREAST IMAGIN/10/2023 Bilateral screening mammogram, indicates BI-RADS Category 2.     REPRODUCTIVE HISTORY: menarche age 13, , first birth age 22,  x 8 months, no OCP's, natural menopause age 53, no HRT, heterogeneously dense tissue    FAMILY CANCER HISTORY:   Father: Lung cancer, age 58  Mother: Lymphoma, age 58     Surgical History  She has a past surgical history that includes Breast biopsy (11/10/2021); Tonsillectomy (2021); Colonoscopy (2021);  section, classic (11/10/2021); and Breast lumpectomy (Left, ).     Social History  She reports that she has never smoked. She has never used smokeless tobacco. She reports that she does not currently use alcohol after a past usage of about 2.0 standard drinks of " alcohol per week. She reports that she does not use drugs.    Family History  No family history on file.     Allergies  Nitrofurantoin macrocrystal, Cheese, and Nitrofurantoin    Medications  Current Outpatient Medications   Medication Instructions    anastrozole (ARIMIDEX) 1 mg, oral, Daily    atorvastatin (LIPITOR) 10 mg, oral, Daily    cholecalciferol (Vitamin D-3) 25 MCG (1000 UT) capsule oral    EPINEPHrine 0.3 mg/0.3 mL injection syringe 1 Syringe, intramuscular, Once as needed    estradiol (ESTRACE) 1 g, vaginal, 2 times weekly    L. acidophilus/Bifid. animalis 32 billion cell capsule oral    vitamin E 45 mg (100 unit) capsule oral         REVIEW OF SYSTEMS    Constitutional:  Negative for appetite change, fatigue, fever and unexpected weight change.   HENT:  Negative for ear pain, hearing loss, nosebleeds, sore throat and trouble swallowing.    Eyes:  Negative for discharge, itching and visual disturbance.   Respiratory:  Negative for cough, chest tightness and shortness of breath.    Cardiovascular:  Negative for chest pain, palpitations and leg swelling.   Breast: as indicated in HPI  Gastrointestinal:  Negative for abdominal pain, constipation, diarrhea and nausea.   Endocrine: Negative for cold intolerance and heat intolerance.   Genitourinary:  Negative for dysuria, frequency, hematuria, pelvic pain and vaginal bleeding.   Musculoskeletal:  Negative for arthralgias, back pain, gait problem, joint swelling and myalgias.   Skin:  Negative for color change and rash.   Allergic/Immunologic: Negative for environmental allergies and food allergies.   Neurological:  Negative for dizziness, tremors, speech difficulty, weakness, numbness and headaches.   Hematological:  Does not bruise/bleed easily.   Psychiatric/Behavioral:  Negative for agitation, dysphoric mood and sleep disturbance. The patient is not nervous/anxious.         Past Medical History  She has a past medical history of Other conditions  influencing health status, Other conditions influencing health status, and Personal history of urinary (tract) infections (11/10/2021).     Physical Exam  Patient is alert and oriented x3 and in a relaxed and appropriate mood. Her gait is steady and hand grasps are equal. Sclera is clear. The breasts are nearly symmetrical. The tissue is soft without palpable abnormalities, discrete nodules or masses. The left breast has a well-healed partial mastectomy incision, superior lateral quadrant. The left axilla has a well-healed biopsy incision. The skin and nipples appear normal. There is no cervical, supraclavicular or axillary lymphadenopathy. Heart rate and rhythm normal, S1 and S2 appreciated. The lungs are clear to auscultation bilaterally. Abdomen is soft and non-tender.       Physical Exam  Chest:              Last Recorded Vitals  Vitals:    04/11/24 0941   BP: 132/79   Pulse: 63       Relevant Results   Time was spent viewing digital images of the radiology testing with the patient. I explained the results in depth, along with suggested explanation for follow up recommendations based on the testing results. BI-RADS Category 2    Imaging     Narrative & Impression   Interpreted By:  Fidel Celis,   STUDY:  BI MAMMO BILATERAL SCREENING TOMOSYNTHESIS;  4/11/2024 9:43 am      ACCESSION NUMBER(S):  [id number]      ORDERING CLINICIAN:  SHARMAINE STEWART      INDICATION:  Screening. History of left breast cancer status post lumpectomy.      COMPARISON:  04/10/2023 and 04/07/2022      FINDINGS:  2D and tomosynthesis images were reviewed at 1 mm slice thickness.      Density:  The breast tissue is heterogeneously dense, which may  obscure small masses.      Stable postsurgical scarring with associated surgical clips in the  superolateral left breast at posterior depth and overlying the left  axilla. No suspicious masses or calcifications are identified.      IMPRESSION:  No mammographic evidence of malignancy.       BI-RADS CATEGORY:      BI-RADS Category:  2 Benign.  Recommendation:  Annual Screening.  Recommended Date:  1 Year.  Laterality:  Bilateral.             Assessment/Plan   Normal clinical exam and imaging, history of left breast cancer, no family history of breast cancer, Anastrozole therapy, heterogeneously dense tissue    Plan: Return in one year for bilateral screening mammogram and office visit. Continue Anastrozole 1mg daily.     Patient Discussion/Summary  Your clinical examination and imaging are normal. Please return in one year for bilateral screening mammogram and office visit or sooner if you have any problems or concerns. Continue Anastrozole 1mg daily.     You can see your health information, review clinical summaries from office visits & test results online when you follow your health with MY  Chart, a personal health record. To sign up go to [email or web address]. If you need assistance with signing up or trouble getting into your account call [facility] Patient Line 24/7 at [phone].    My office phone number is [phone] if you need to get in touch with me or have additional questions or concerns. Thank you for choosing [facility] and trusting me as your healthcare provider. I look forward to seeing you again at your next office visit. I am honored to be a provider on your health care team and I remain dedicated to helping you achieve your health goals.      Heike Madrid, APRN-CNP

## 2024-04-09 DIAGNOSIS — M81.8 DISUSE OSTEOPOROSIS: ICD-10-CM

## 2024-04-11 ENCOUNTER — OFFICE VISIT (OUTPATIENT)
Dept: SURGICAL ONCOLOGY | Facility: CLINIC | Age: 74
End: 2024-04-11
Payer: MEDICARE

## 2024-04-11 ENCOUNTER — HOSPITAL ENCOUNTER (OUTPATIENT)
Dept: RADIOLOGY | Facility: CLINIC | Age: 74
Discharge: HOME | End: 2024-04-11
Payer: MEDICARE

## 2024-04-11 VITALS
HEART RATE: 63 BPM | SYSTOLIC BLOOD PRESSURE: 132 MMHG | DIASTOLIC BLOOD PRESSURE: 79 MMHG | HEIGHT: 66 IN | BODY MASS INDEX: 19.09 KG/M2 | WEIGHT: 118.8 LBS

## 2024-04-11 VITALS — WEIGHT: 118.83 LBS | BODY MASS INDEX: 19.8 KG/M2 | HEIGHT: 65 IN

## 2024-04-11 DIAGNOSIS — Z79.811 USE OF ANASTROZOLE: ICD-10-CM

## 2024-04-11 DIAGNOSIS — Z85.3 ENCOUNTER FOR FOLLOW-UP SURVEILLANCE OF BREAST CANCER: Primary | ICD-10-CM

## 2024-04-11 DIAGNOSIS — Z12.31 ENCOUNTER FOR SCREENING MAMMOGRAM FOR MALIGNANT NEOPLASM OF BREAST: ICD-10-CM

## 2024-04-11 DIAGNOSIS — Z08 ENCOUNTER FOR FOLLOW-UP SURVEILLANCE OF BREAST CANCER: Primary | ICD-10-CM

## 2024-04-11 PROCEDURE — 1159F MED LIST DOCD IN RCRD: CPT | Performed by: NURSE PRACTITIONER

## 2024-04-11 PROCEDURE — 99213 OFFICE O/P EST LOW 20 MIN: CPT | Performed by: NURSE PRACTITIONER

## 2024-04-11 PROCEDURE — 1126F AMNT PAIN NOTED NONE PRSNT: CPT | Performed by: NURSE PRACTITIONER

## 2024-04-11 PROCEDURE — 1036F TOBACCO NON-USER: CPT | Performed by: NURSE PRACTITIONER

## 2024-04-11 PROCEDURE — 1157F ADVNC CARE PLAN IN RCRD: CPT | Performed by: NURSE PRACTITIONER

## 2024-04-11 PROCEDURE — 77067 SCR MAMMO BI INCL CAD: CPT | Mod: BILATERAL PROCEDURE | Performed by: STUDENT IN AN ORGANIZED HEALTH CARE EDUCATION/TRAINING PROGRAM

## 2024-04-11 PROCEDURE — 77063 BREAST TOMOSYNTHESIS BI: CPT | Mod: BILATERAL PROCEDURE | Performed by: STUDENT IN AN ORGANIZED HEALTH CARE EDUCATION/TRAINING PROGRAM

## 2024-04-11 PROCEDURE — 77067 SCR MAMMO BI INCL CAD: CPT

## 2024-04-11 ASSESSMENT — PAIN SCALES - GENERAL: PAINLEVEL: 0-NO PAIN

## 2024-04-11 NOTE — PATIENT INSTRUCTIONS
Your clinical examination and imaging are normal. Please return in one year for bilateral screening mammogram and office visit or sooner if you have any problems or concerns. Continue Anastrozole 1mg daily.     You can see your health information, review clinical summaries from office visits & test results online when you follow your health with MY  Chart, a personal health record. To sign up go to [email or web address]. If you need assistance with signing up or trouble getting into your account call [facility] Patient Line 24/7 at [phone].    My office phone number is [phone] if you need to get in touch with me or have additional questions or concerns. Thank you for choosing [facility] and trusting me as your healthcare provider. I look forward to seeing you again at your next office visit. I am honored to be a provider on your health care team and I remain dedicated to helping you achieve your health goals.

## 2024-05-11 ENCOUNTER — TELEPHONE (OUTPATIENT)
Dept: PRIMARY CARE | Facility: CLINIC | Age: 74
End: 2024-05-11
Payer: MEDICARE

## 2024-05-11 NOTE — TELEPHONE ENCOUNTER
"Raúl called with concerns regarding his wife Ceasar who has been having intermittent episodes of leg dysfunction with her \"foot turning inward\".  They are currently in [place] travelling home from a trip to [place].  He reports this has been going on intermittently for the past 6 weeks or so.  No urinary retention or incontinence .  No other acute illness.  I advised them to call the office first thing on Monday to be seen and if her leg symptoms worsen or progresses to go to the ER.    "

## 2024-05-13 ENCOUNTER — OFFICE VISIT (OUTPATIENT)
Dept: PRIMARY CARE | Facility: CLINIC | Age: 74
End: 2024-05-13
Payer: MEDICARE

## 2024-05-13 VITALS — OXYGEN SATURATION: 97 % | SYSTOLIC BLOOD PRESSURE: 130 MMHG | DIASTOLIC BLOOD PRESSURE: 62 MMHG | HEART RATE: 68 BPM

## 2024-05-13 DIAGNOSIS — R42 VERTIGO: Primary | ICD-10-CM

## 2024-05-13 PROCEDURE — 99213 OFFICE O/P EST LOW 20 MIN: CPT | Performed by: INTERNAL MEDICINE

## 2024-05-13 PROCEDURE — 1157F ADVNC CARE PLAN IN RCRD: CPT | Performed by: INTERNAL MEDICINE

## 2024-05-13 PROCEDURE — 1159F MED LIST DOCD IN RCRD: CPT | Performed by: INTERNAL MEDICINE

## 2024-05-13 NOTE — PROGRESS NOTES
Subjective   Patient ID: Paulina Knutson is a 73 y.o. female who presents for No chief complaint on file..    Patient is here with what sounds like increasing frequency of episodes of positional vertigo.  Patient is usually doing well and all of a sudden will have an attack of feeling that the room is spinning she loses her balance.  She has to sit down and then often feels that she has to have a bowel movement.  After she goes to the bathroom her vertigo symptoms seem to resolve fairly quickly.  She last had these happen when she was in [place] doing sightseeing spending a lot of time in crowded areas where she was doing a lot of looking up and down and from side-to-side.  She never wakes up with symptoms.  She has no hearing loss no tinnitus.  She has no chest pain or shortness of breath she has no syncopal or presyncopal episodes.  Her family was with her during this trip and checked her blood pressure repeatedly during the episodes and it was always within normal limits.         Review of Systems    Objective   /62   Pulse 68   SpO2 97%     Physical Exam  HENT:      Right Ear: Tympanic membrane normal.      Left Ear: Tympanic membrane normal.   Eyes:      Extraocular Movements: Extraocular movements intact.      Pupils: Pupils are equal, round, and reactive to light.   Neck:      Vascular: No carotid bruit.   Cardiovascular:      Rate and Rhythm: Normal rate and regular rhythm.      Heart sounds: Normal heart sounds.   Pulmonary:      Breath sounds: Normal breath sounds.   Lymphadenopathy:      Cervical: No cervical adenopathy.   Neurological:      General: No focal deficit present.      Comments: No nystagmus noted.         Assessment/Plan   Problem List Items Addressed This Visit             ICD-10-CM    Vertigo - Primary R42     Most likely recurrent BPPV.  Patient will be referred to the [employer] exercises that she will start doing on a regular basis if her symptoms do not improve that she  may benefit from referral for vestibular therapy.  Nothing to suggest any Ménière's disease at this time nothing to suggest any hypotensive related syncope symptoms she will call if symptoms do not improve.

## 2024-05-13 NOTE — ASSESSMENT & PLAN NOTE
Most likely recurrent BPPV.  Patient will be referred to the [employer] exercises that she will start doing on a regular basis if her symptoms do not improve that she may benefit from referral for vestibular therapy.  Nothing to suggest any Ménière's disease at this time nothing to suggest any hypotensive related syncope symptoms she will call if symptoms do not improve.

## 2024-05-17 ENCOUNTER — TELEPHONE (OUTPATIENT)
Dept: PRIMARY CARE | Facility: CLINIC | Age: 74
End: 2024-05-17
Payer: MEDICARE

## 2024-05-17 DIAGNOSIS — R42 VERTIGO: Primary | ICD-10-CM

## 2024-05-17 NOTE — TELEPHONE ENCOUNTER
Spoke with patient her symptoms are persistent and now she is having some headaches as well.  Given the new onset of the symptoms as well as nausea and headaches we will obtain an MRI to rule out any intracranial process..  Again encouraged her to continue the exercises for vertigo.    ----- Message from Yolanda Fuchs MA sent at 5/17/2024 12:02 PM EDT -----  Contact: [phone]  Raúl calling-  Her  wants her to see the head of neurology.

## 2024-05-20 DIAGNOSIS — R42 VERTIGO: Primary | ICD-10-CM

## 2024-05-24 ENCOUNTER — APPOINTMENT (OUTPATIENT)
Dept: NEUROLOGY | Facility: CLINIC | Age: 74
End: 2024-05-24
Payer: MEDICARE

## 2024-05-24 ENCOUNTER — APPOINTMENT (OUTPATIENT)
Dept: RADIOLOGY | Facility: HOSPITAL | Age: 74
End: 2024-05-24
Payer: MEDICARE

## 2024-05-24 ENCOUNTER — HOSPITAL ENCOUNTER (OUTPATIENT)
Dept: RADIOLOGY | Facility: HOSPITAL | Age: 74
Discharge: HOME | End: 2024-05-24
Payer: MEDICARE

## 2024-05-24 DIAGNOSIS — R42 VERTIGO: ICD-10-CM

## 2024-05-24 PROCEDURE — 70546 MR ANGIOGRAPH HEAD W/O&W/DYE: CPT | Mod: 59

## 2024-05-24 PROCEDURE — A9575 INJ GADOTERATE MEGLUMI 0.1ML: HCPCS | Performed by: INTERNAL MEDICINE

## 2024-05-24 PROCEDURE — 70553 MRI BRAIN STEM W/O & W/DYE: CPT | Performed by: RADIOLOGY

## 2024-05-24 PROCEDURE — 2550000001 HC RX 255 CONTRASTS: Performed by: INTERNAL MEDICINE

## 2024-05-24 PROCEDURE — 70553 MRI BRAIN STEM W/O & W/DYE: CPT

## 2024-05-24 RX ORDER — GADOTERATE MEGLUMINE 376.9 MG/ML
11 INJECTION INTRAVENOUS
Status: COMPLETED | OUTPATIENT
Start: 2024-05-24 | End: 2024-05-24

## 2024-05-24 RX ADMIN — GADOTERATE MEGLUMINE 11 ML: 376.9 INJECTION INTRAVENOUS at 09:26

## 2024-05-31 ENCOUNTER — APPOINTMENT (OUTPATIENT)
Dept: RADIOLOGY | Facility: HOSPITAL | Age: 74
End: 2024-05-31
Payer: MEDICARE

## 2024-06-03 ENCOUNTER — APPOINTMENT (OUTPATIENT)
Dept: OBSTETRICS AND GYNECOLOGY | Facility: CLINIC | Age: 74
End: 2024-06-03
Payer: MEDICARE

## 2024-06-05 ENCOUNTER — OFFICE VISIT (OUTPATIENT)
Dept: PRIMARY CARE | Facility: CLINIC | Age: 74
End: 2024-06-05
Payer: MEDICARE

## 2024-06-05 VITALS — SYSTOLIC BLOOD PRESSURE: 130 MMHG | OXYGEN SATURATION: 98 % | HEART RATE: 61 BPM | DIASTOLIC BLOOD PRESSURE: 86 MMHG

## 2024-06-05 DIAGNOSIS — Z17.0 MALIGNANT NEOPLASM OF LEFT BREAST IN FEMALE, ESTROGEN RECEPTOR POSITIVE, UNSPECIFIED SITE OF BREAST (MULTI): Primary | ICD-10-CM

## 2024-06-05 DIAGNOSIS — E16.2 HYPOGLYCEMIA, UNSPECIFIED: ICD-10-CM

## 2024-06-05 DIAGNOSIS — C50.912 MALIGNANT NEOPLASM OF LEFT BREAST IN FEMALE, ESTROGEN RECEPTOR POSITIVE, UNSPECIFIED SITE OF BREAST (MULTI): Primary | ICD-10-CM

## 2024-06-05 DIAGNOSIS — M79.7 FIBROMYALGIA: ICD-10-CM

## 2024-06-05 DIAGNOSIS — R42 VERTIGO: ICD-10-CM

## 2024-06-05 DIAGNOSIS — R10.84 GENERALIZED ABDOMINAL PAIN: ICD-10-CM

## 2024-06-05 PROCEDURE — 99214 OFFICE O/P EST MOD 30 MIN: CPT | Performed by: INTERNAL MEDICINE

## 2024-06-05 PROCEDURE — 1157F ADVNC CARE PLAN IN RCRD: CPT | Performed by: INTERNAL MEDICINE

## 2024-06-05 NOTE — PROGRESS NOTES
Subjective   Patient ID: Paulina Knutson is a 73 y.o. female who presents for No chief complaint on file..    Patient is here after she was noted to have some hypertension in her dentist office.  He used a automated cuff and got a reading of 186/82.  Here in the office however blood pressure cuff reads 134/86.  Patient continues to struggle with generalized fatigue muscle aches bone pain and positional vertigo.  She is doing vestibular therapy but has had no further episodes of acute vertigo.  She has generalized fatigue and weakness and wonders if possibly her Arimidex is causing it.  She been taking it for 2 and half years since her breast cancer diagnosis and says that she has not felt well since she has been on it.  She continues to have a lot of pain in her right jaw but has been to her dentist to found no pathology.  She had an extensive neurologic workup including a CT  as well as an MRA MRI all of which showed no significant abnormality.         Review of Systems    Objective   /86   Pulse 61   SpO2 98%     Physical Exam    Assessment/Plan   Problem List Items Addressed This Visit             ICD-10-CM    Vertigo R42     Patient continues to do vestibular therapy.  She also has lots of generalized aches and pains fatigue malaise and wonders if it may be related to her Arimidex therapy.  Given the somewhat optional nature of the treatment after her surgery.  We will discontinue her Arimidex for period of 6 weeks and see how she feels.  If she makes a full recovery we can restart the Arimidex to prove causality.  If she does not improve symptom wise we will get her back on Arimidex to complete a 5-year course.  She will continue her vestibular therapy         Generalized abdominal pain R10.84    Relevant Orders    CBC and Auto Differential    Hemoglobin A1C    Comprehensive Metabolic Panel    TSH with reflex to Free T4 if abnormal    Sedimentation Rate    Breast cancer, left (Multi) - Primary C50.912     Relevant Orders    CBC and Auto Differential    Hemoglobin A1C    Comprehensive Metabolic Panel    TSH with reflex to Free T4 if abnormal    Sedimentation Rate     Other Visit Diagnoses         Codes    Fibromyalgia     M79.7    Relevant Orders    Hemoglobin A1C    TSH with reflex to Free T4 if abnormal    Hypoglycemia, unspecified     E16.2    Relevant Orders    Hemoglobin A1C

## 2024-06-05 NOTE — ASSESSMENT & PLAN NOTE
Patient continues to do vestibular therapy.  She also has lots of generalized aches and pains fatigue malaise and wonders if it may be related to her Arimidex therapy.  Given the somewhat optional nature of the treatment after her surgery.  We will discontinue her Arimidex for period of 6 weeks and see how she feels.  If she makes a full recovery we can restart the Arimidex to prove causality.  If she does not improve symptom wise we will get her back on Arimidex to complete a 5-year course.  She will continue her vestibular therapy

## 2024-06-09 NOTE — PROGRESS NOTES
Urogynecology  Provider:  Emma Quiroz MD  [phone]              ASSESSMENT AND PLAN:   73 y.o. female being assessed for urethral prolapse and rUTIs.    Diagnoses:  #1 Urethral Prolapse  #2 Recurrent UTIs    Plan:  Urethral caruncle  - Small and stable on exam, no intervention needed at this time.    2. rUTIs  - Patient has been infection-free with the use of estrogen cream.  - Continue using estrogen cream as prescribed.  - Urine sample negative for nitrites and overall reassuring.    Return in 6 months for follow-up with Dr. Quiroz.    Scribe Attestation  By signing my name below, I, Frank Oliva, attest that this documentation has been prepared under the direction and in the presence of Emma Quiroz MD on 06/10/2024 at 10:23 AM.    Agree with above. I Dr. Quiroz, personally performed the services described in the documentation which was scribed virtually and confirm it is both complete and accurate.  Emma Quiroz MD          Problem List Items Addressed This Visit    None          I spent a total of eConsult Time: 20 minutes in face to face and non face to face time.        Emma Quiroz MD        HISTORY OF PRESENT ILLNESS:     Last visit 12/2023  Expand All Collapse All    Urogynecology  Provider:  Emma Quiroz MD  [phone]                    ASSESSMENT AND PLAN:      Problem List Items Addressed This Visit    None           I spent a total of eConsult Time: 30 minutes in face to face and non face to face time.         Emma Quiroz MD  HISTORY OF PRESENT ILLNESS     Paulina Aranda a 73 y.o. here today for a follow up      Urinary symptoms:  -  Had one UTI since last visit  - Going on vacation to [place] and would like a PRN script of Bactrim  - Using estrogen twice a week      Urine culture  review:  - Escherichia coli (A)    07/06/2023     Screenings:  - Mammogram up to date         Recurrent UTI-- no recent UTI  Vaginal atrophy, not consistent  "with tv estrogen use     NO U. Cx since last visit      Interval History:  - She reports incidents where she gets \"wobbly\" and feels unsteady.  - She has pain in her jaw when she gets tense.  - She is still taking Lipitor for cholesterol.  - She is doing PT for balance therapy. PT said she did not have vertigo.    Social History:  - , lives with .  - Recently traveled to [place] and the [facility].  - Engages in regular physical activity, including walking 2 miles a day.    Past Medical History:  - Breast cancer diagnosed in .  - Hypertension.    Past Surgical History:  - Lumpectomy for breast cancer.  - Radiation therapy (5 cycles).    OBGYN Symptoms:  - She recently stopped taking Arimidex due to side effects.     Prolapse Symptoms :  - Small urethral caruncle noted on exam.     Urinary Symptoms:   - Urine sample negative for nitrites and overall reassuring.  - She reports no recent UTIs since adding estrogen cream to her regimen.          Past Medical History:      Past Medical History:   Diagnosis Date    Other conditions influencing health status     Urinary Tract Infection    Other conditions influencing health status     Foot pain, unspecified laterality    Personal history of urinary (tract) infections 11/10/2021    History of urinary tract infection          Past Surgical History:       Past Surgical History:   Procedure Laterality Date    BREAST BIOPSY  11/10/2021    Biopsy Breast Open    BREAST LUMPECTOMY Left     with RAD     SECTION, CLASSIC  11/10/2021     Section    COLONOSCOPY  2021    Complete Colonoscopy    TONSILLECTOMY  2021    Tonsillectomy         Medications:       Prior to Admission medications    Medication Sig Start Date End Date Taking? Authorizing Provider   anastrozole (Arimidex) 1 mg tablet Take 1 tablet (1 mg total) by mouth once daily. 24   Raúl Granados MD   atorvastatin (Lipitor) 10 mg tablet Take 1 tablet (10 mg) by mouth once " "daily. 1/24/24   Raúl Granados MD   cholecalciferol (Vitamin D-3) 25 MCG (1000 UT) capsule Take by mouth.    Historical Provider, MD   EPINEPHrine 0.3 mg/0.3 mL injection syringe Inject 0.3 mL (0.3 mg) into the muscle 1 time if needed. 8/31/23   Historical Provider, MD   estradiol (Estrace) 0.01 % (0.1 mg/gram) vaginal cream Insert 0.25 Applicatorfuls (1 g) into the vagina 2 times a week. 12/4/23   Emma Quiroz MD   L. acidophilus/Bifid. animalis 32 billion cell capsule Take by mouth. 4/1/21   Historical Provider, MD   vitamin E 45 mg (100 unit) capsule Take by mouth. 4/1/21   Historical Provider, MD LOCK  Review of Systems   Constitutional: Negative.    HENT:          Jaw pain when tense, unrelated to TMJ.   Eyes: Negative.    Respiratory: Negative.     Cardiovascular: Negative.    Gastrointestinal: Negative.    Endocrine: Negative.    Musculoskeletal: Negative.    Neurological:         Episodes of feeling \"wobbly\" and unsteady.   Psychiatric/Behavioral: Negative.          Blood, Urine   Date Value Ref Range Status   11/27/2023 NEGATIVE NEGATIVE Final     Nitrite, Urine   Date Value Ref Range Status   11/27/2023 NEGATIVE NEGATIVE Final     Urobilinogen, Urine   Date Value Ref Range Status   11/27/2023 <2.0 <2.0 mg/dL Final         PHYSICAL EXAM:      There were no vitals taken for this visit.     No LMP recorded.      Declines chaperone for physical exam.      Well developed, well nourished, in no apparent distress.   Neurologic/Psychiatric:  Awake, Alert and Oriented times 3.  Affect normal.     GENITAL/URINARY:       External Genitalia:  The patient has normal appearing external genitalia, normal skenes and bartholins glands, and a normal hair distribution.  Her vulva is without lesions, erythema or discharge.  It is non-tender with appropriate sensation.     Urethral Meatus:  Size normal, Location normal, Lesions absent, Prolapse absent, Small caruncle, which is stable.     Urethra:  Fullness " absent, Masses absent,        Data and DIAGNOSTIC STUDIES REVIEWED   MR brain w and wo IV contrast    Result Date: 5/24/2024  Interpreted By:  Jessee Morgan, STUDY: MR ANGIO HEAD W AND WO IV CONTRAST; MR BRAIN W AND WO IV CONTRAST; 5/24/2024 9:30 am; 5/24/2024 8:42 am   INDICATION: Signs/Symptoms:Vertigo.   COMPARISON: MRI brain dated 09/12/2022. MRI IAC dated 01/10/2022.   ACCESSION NUMBER(S): [id number]; [id number]   ORDERING CLINICIAN: ROB BEACH   TECHNIQUE: 1) Standard multiplanar multisequence MR imaging was performed through the brain prior to and following administration of 11 mL Dotarem intravenous contrast. 2) Noncontrast 3D time-of-flight MRA imaging was performed through the brain. Contrast-enhanced MRA of the head was also obtained.   FINDINGS: BRAIN:   Parenchyma: There is no diffusion restriction abnormality to suggest acute infarct.  No evidence of recent hemorrhage. There is no mass effect or midline shift. There is an unchanged 3 mm focus of enhancement within the dorsal leftward aspect of the asher (series 13, image 40), unchanged in appearance dating back to 01/10/2022 MRI brain/IAC examination. There is associated GRE hypointensity with the lesion. There is otherwise no abnormal parenchymal or leptomeningeal enhancement within the brain parenchyma. Mild chronic small vessel ischemic disease suggested.   CSF Spaces: The ventricles, sulci and basal cisterns are stable in appearance with senescent change. Basilar cisterns are patent.   Extra-axial spaces: No extra-axial fluid collection.   Paranasal Sinuses: Mucosal thickening versus dependent fluid within the right maxillary paranasal sinus without significant opacification. Mild diffuse mucosal thickening of the bilateral ethmoids.   Mastoids: Fluid within the right mastoid with trace fluid involving a few left mastoid air cells.   Orbits: Normal.   Calvarium: No suspicious osseous marrow signal.     VASCULAR FINDINGS:   Internal carotid  arteries: Normal flow signal bilaterally.   Anterior cerebral arteries: Azygous configuration suggested. Otherwise normal flow signal bilaterally.   Middle cerebral arteries: Normal flow signal bilaterally.   Vertebral arteries: Normal flow signal bilaterally.   Basilar artery: Normal flow signal.   Posterior communicating arteries: Visualized on the left, diminutive versus absent on the right.   Posterior cerebral arteries: Fetal versus near fetal origin on the left. Normal flow signal bilaterally.   Limited assessment of the neck vasculature demonstrates tortuosity of the left-greater-than-right cervical internal carotid arteries.       Stable MR appearance of the brain. No acute infarct, recent hemorrhage, or intracranial mass effect. Unchanged 3 mm diameter focus of enhancement within the dorsal leftward aspect of the asher with associated GRE hypointensity that may reflect a tiny slow flow vascular malformation such as cavernoma or telangiectasia. This is not appreciably changed dating back to 2022 examination. Otherwise no abnormal intracranial enhancement.   No evidence of intracranial source vessel arterial occlusion, flow significant stenosis, or aneurysm.   Similar chronic small vessel ischemic disease and senescent change.   MACRO: None   Signed by: Jessee Morgan 5/24/2024 11:54 AM Dictation workstation:   [id number]    MR angio head w and wo IV contrast    Result Date: 5/24/2024  Interpreted By:  Jessee Morgan, STUDY: MR ANGIO HEAD W AND WO IV CONTRAST; MR BRAIN W AND WO IV CONTRAST; 5/24/2024 9:30 am; 5/24/2024 8:42 am   INDICATION: Signs/Symptoms:Vertigo.   COMPARISON: MRI brain dated 09/12/2022. MRI IAC dated 01/10/2022.   ACCESSION NUMBER(S): [id number]; [id number]   ORDERING CLINICIAN: ROB BEACH   TECHNIQUE: 1) Standard multiplanar multisequence MR imaging was performed through the brain prior to and following administration of 11 mL Dotarem intravenous contrast. 2) Noncontrast 3D  time-of-flight MRA imaging was performed through the brain. Contrast-enhanced MRA of the head was also obtained.   FINDINGS: BRAIN:   Parenchyma: There is no diffusion restriction abnormality to suggest acute infarct.  No evidence of recent hemorrhage. There is no mass effect or midline shift. There is an unchanged 3 mm focus of enhancement within the dorsal leftward aspect of the asher (series 13, image 40), unchanged in appearance dating back to 01/10/2022 MRI brain/IAC examination. There is associated GRE hypointensity with the lesion. There is otherwise no abnormal parenchymal or leptomeningeal enhancement within the brain parenchyma. Mild chronic small vessel ischemic disease suggested.   CSF Spaces: The ventricles, sulci and basal cisterns are stable in appearance with senescent change. Basilar cisterns are patent.   Extra-axial spaces: No extra-axial fluid collection.   Paranasal Sinuses: Mucosal thickening versus dependent fluid within the right maxillary paranasal sinus without significant opacification. Mild diffuse mucosal thickening of the bilateral ethmoids.   Mastoids: Fluid within the right mastoid with trace fluid involving a few left mastoid air cells.   Orbits: Normal.   Calvarium: No suspicious osseous marrow signal.     VASCULAR FINDINGS:   Internal carotid arteries: Normal flow signal bilaterally.   Anterior cerebral arteries: Azygous configuration suggested. Otherwise normal flow signal bilaterally.   Middle cerebral arteries: Normal flow signal bilaterally.   Vertebral arteries: Normal flow signal bilaterally.   Basilar artery: Normal flow signal.   Posterior communicating arteries: Visualized on the left, diminutive versus absent on the right.   Posterior cerebral arteries: Fetal versus near fetal origin on the left. Normal flow signal bilaterally.   Limited assessment of the neck vasculature demonstrates tortuosity of the left-greater-than-right cervical internal carotid arteries.        Stable MR appearance of the brain. No acute infarct, recent hemorrhage, or intracranial mass effect. Unchanged 3 mm diameter focus of enhancement within the dorsal leftward aspect of the asher with associated GRE hypointensity that may reflect a tiny slow flow vascular malformation such as cavernoma or telangiectasia. This is not appreciably changed dating back to 2022 examination. Otherwise no abnormal intracranial enhancement.   No evidence of intracranial source vessel arterial occlusion, flow significant stenosis, or aneurysm.   Similar chronic small vessel ischemic disease and senescent change.   MACRO: None   Signed by: Jessee Morgan 5/24/2024 11:54 AM Dictation workstation:   [id number]     Lab Results   Component Value Date    URINECULTURE Escherichia coli (A) 07/06/2023      Lab Results   Component Value Date    GLUCOSE 105 (H) 11/27/2023    CALCIUM 9.8 11/27/2023     11/27/2023    K 4.5 11/27/2023    CO2 32 11/27/2023     11/27/2023    BUN 20 11/27/2023    CREATININE 0.84 11/27/2023     Lab Results   Component Value Date    WBC 5.8 11/27/2023    HGB 14.7 11/27/2023    HCT 45.5 11/27/2023     11/27/2023     11/27/2023          Emma Quiroz MD

## 2024-06-10 ENCOUNTER — OFFICE VISIT (OUTPATIENT)
Dept: OBSTETRICS AND GYNECOLOGY | Facility: CLINIC | Age: 74
End: 2024-06-10
Payer: MEDICARE

## 2024-06-10 VITALS
WEIGHT: 119 LBS | HEIGHT: 66 IN | SYSTOLIC BLOOD PRESSURE: 196 MMHG | DIASTOLIC BLOOD PRESSURE: 84 MMHG | BODY MASS INDEX: 19.13 KG/M2

## 2024-06-10 DIAGNOSIS — N39.0 RECURRENT UTI (URINARY TRACT INFECTION): Primary | ICD-10-CM

## 2024-06-10 DIAGNOSIS — E78.2 MIXED HYPERLIPIDEMIA: ICD-10-CM

## 2024-06-10 LAB
POC APPEARANCE, URINE: CLEAR
POC BILIRUBIN, URINE: NEGATIVE
POC BLOOD, URINE: NEGATIVE
POC COLOR, URINE: YELLOW
POC GLUCOSE, URINE: NEGATIVE MG/DL
POC KETONES, URINE: NEGATIVE MG/DL
POC LEUKOCYTES, URINE: ABNORMAL
POC NITRITE,URINE: NEGATIVE
POC PH, URINE: 6.5 PH
POC PROTEIN, URINE: NEGATIVE MG/DL
POC SPECIFIC GRAVITY, URINE: 1.02
POC UROBILINOGEN, URINE: 0.2 EU/DL

## 2024-06-10 PROCEDURE — 1157F ADVNC CARE PLAN IN RCRD: CPT | Performed by: OBSTETRICS & GYNECOLOGY

## 2024-06-10 PROCEDURE — 81003 URINALYSIS AUTO W/O SCOPE: CPT | Mod: QW | Performed by: OBSTETRICS & GYNECOLOGY

## 2024-06-10 PROCEDURE — 1159F MED LIST DOCD IN RCRD: CPT | Performed by: OBSTETRICS & GYNECOLOGY

## 2024-06-10 PROCEDURE — 99213 OFFICE O/P EST LOW 20 MIN: CPT | Performed by: OBSTETRICS & GYNECOLOGY

## 2024-06-10 PROCEDURE — 1125F AMNT PAIN NOTED PAIN PRSNT: CPT | Performed by: OBSTETRICS & GYNECOLOGY

## 2024-06-10 RX ORDER — ATORVASTATIN CALCIUM 10 MG/1
10 TABLET, FILM COATED ORAL DAILY
Qty: 90 TABLET | Refills: 3 | Status: SHIPPED | OUTPATIENT
Start: 2024-06-10

## 2024-06-10 ASSESSMENT — ENCOUNTER SYMPTOMS
ENDOCRINE NEGATIVE: 1
GASTROINTESTINAL NEGATIVE: 1
PSYCHIATRIC NEGATIVE: 1
MUSCULOSKELETAL NEGATIVE: 1
RESPIRATORY NEGATIVE: 1
CONSTITUTIONAL NEGATIVE: 1
EYES NEGATIVE: 1
CARDIOVASCULAR NEGATIVE: 1

## 2024-06-10 ASSESSMENT — PAIN SCALES - GENERAL: PAINLEVEL: 2

## 2024-06-12 ENCOUNTER — LAB (OUTPATIENT)
Dept: LAB | Facility: LAB | Age: 74
End: 2024-06-12
Payer: MEDICARE

## 2024-06-12 DIAGNOSIS — M79.7 FIBROMYALGIA: ICD-10-CM

## 2024-06-12 DIAGNOSIS — E16.2 HYPOGLYCEMIA, UNSPECIFIED: ICD-10-CM

## 2024-06-12 DIAGNOSIS — R35.0 URINARY FREQUENCY: ICD-10-CM

## 2024-06-12 DIAGNOSIS — C50.912 MALIGNANT NEOPLASM OF LEFT BREAST IN FEMALE, ESTROGEN RECEPTOR POSITIVE, UNSPECIFIED SITE OF BREAST (MULTI): ICD-10-CM

## 2024-06-12 DIAGNOSIS — Z17.0 MALIGNANT NEOPLASM OF LEFT BREAST IN FEMALE, ESTROGEN RECEPTOR POSITIVE, UNSPECIFIED SITE OF BREAST (MULTI): ICD-10-CM

## 2024-06-12 DIAGNOSIS — R10.84 GENERALIZED ABDOMINAL PAIN: ICD-10-CM

## 2024-06-12 LAB
ALBUMIN SERPL BCP-MCNC: 4.5 G/DL (ref 3.4–5)
ALP SERPL-CCNC: 79 U/L (ref 33–136)
ALT SERPL W P-5'-P-CCNC: 10 U/L (ref 7–45)
ANION GAP SERPL CALC-SCNC: 12 MMOL/L (ref 10–20)
APPEARANCE UR: CLEAR
AST SERPL W P-5'-P-CCNC: 14 U/L (ref 9–39)
BASOPHILS # BLD AUTO: 0.06 X10*3/UL (ref 0–0.1)
BASOPHILS NFR BLD AUTO: 0.5 %
BILIRUB SERPL-MCNC: 0.9 MG/DL (ref 0–1.2)
BILIRUB UR STRIP.AUTO-MCNC: NEGATIVE MG/DL
BUN SERPL-MCNC: 20 MG/DL (ref 6–23)
CALCIUM SERPL-MCNC: 10 MG/DL (ref 8.6–10.6)
CHLORIDE SERPL-SCNC: 102 MMOL/L (ref 98–107)
CO2 SERPL-SCNC: 32 MMOL/L (ref 21–32)
COLOR UR: YELLOW
CREAT SERPL-MCNC: 0.86 MG/DL (ref 0.5–1.05)
EGFRCR SERPLBLD CKD-EPI 2021: 71 ML/MIN/1.73M*2
EOSINOPHIL # BLD AUTO: 0.05 X10*3/UL (ref 0–0.4)
EOSINOPHIL NFR BLD AUTO: 0.4 %
ERYTHROCYTE [DISTWIDTH] IN BLOOD BY AUTOMATED COUNT: 12.9 % (ref 11.5–14.5)
ERYTHROCYTE [SEDIMENTATION RATE] IN BLOOD BY WESTERGREN METHOD: 10 MM/H (ref 0–30)
EST. AVERAGE GLUCOSE BLD GHB EST-MCNC: 117 MG/DL
GLUCOSE SERPL-MCNC: 119 MG/DL (ref 74–99)
GLUCOSE UR STRIP.AUTO-MCNC: NEGATIVE MG/DL
HBA1C MFR BLD: 5.7 %
HCT VFR BLD AUTO: 45.5 % (ref 36–46)
HGB BLD-MCNC: 15.1 G/DL (ref 12–16)
IMM GRANULOCYTES # BLD AUTO: 0.05 X10*3/UL (ref 0–0.5)
IMM GRANULOCYTES NFR BLD AUTO: 0.4 % (ref 0–0.9)
KETONES UR STRIP.AUTO-MCNC: NEGATIVE MG/DL
LEUKOCYTE ESTERASE UR QL STRIP.AUTO: ABNORMAL
LYMPHOCYTES # BLD AUTO: 1.19 X10*3/UL (ref 0.8–3)
LYMPHOCYTES NFR BLD AUTO: 9 %
MCH RBC QN AUTO: 31.5 PG (ref 26–34)
MCHC RBC AUTO-ENTMCNC: 33.2 G/DL (ref 32–36)
MCV RBC AUTO: 95 FL (ref 80–100)
MONOCYTES # BLD AUTO: 0.68 X10*3/UL (ref 0.05–0.8)
MONOCYTES NFR BLD AUTO: 5.2 %
NEUTROPHILS # BLD AUTO: 11.14 X10*3/UL (ref 1.6–5.5)
NEUTROPHILS NFR BLD AUTO: 84.5 %
NITRITE UR QL STRIP.AUTO: NEGATIVE
NRBC BLD-RTO: 0 /100 WBCS (ref 0–0)
PH UR STRIP.AUTO: 6.5 [PH]
PLATELET # BLD AUTO: 210 X10*3/UL (ref 150–450)
POTASSIUM SERPL-SCNC: 4.5 MMOL/L (ref 3.5–5.3)
PROT SERPL-MCNC: 6.5 G/DL (ref 6.4–8.2)
PROT UR STRIP.AUTO-MCNC: ABNORMAL MG/DL
RBC # BLD AUTO: 4.8 X10*6/UL (ref 4–5.2)
RBC # UR STRIP.AUTO: ABNORMAL /UL
RBC #/AREA URNS AUTO: NORMAL /HPF
SODIUM SERPL-SCNC: 141 MMOL/L (ref 136–145)
SP GR UR STRIP.AUTO: 1.02
TSH SERPL-ACNC: 1.44 MIU/L (ref 0.44–3.98)
UROBILINOGEN UR STRIP.AUTO-MCNC: ABNORMAL MG/DL
WBC # BLD AUTO: 13.2 X10*3/UL (ref 4.4–11.3)
WBC #/AREA URNS AUTO: NORMAL /HPF

## 2024-06-12 PROCEDURE — 36415 COLL VENOUS BLD VENIPUNCTURE: CPT

## 2024-06-13 DIAGNOSIS — R39.9 URINARY SYMPTOM OR SIGN: Primary | ICD-10-CM

## 2024-06-13 RX ORDER — SULFAMETHOXAZOLE AND TRIMETHOPRIM 800; 160 MG/1; MG/1
1 TABLET ORAL 2 TIMES DAILY
Qty: 6 TABLET | Refills: 0 | Status: SHIPPED | OUTPATIENT
Start: 2024-06-13 | End: 2024-06-16

## 2024-06-14 LAB — BACTERIA UR CULT: ABNORMAL

## 2024-08-06 ENCOUNTER — APPOINTMENT (OUTPATIENT)
Dept: DERMATOLOGY | Facility: CLINIC | Age: 74
End: 2024-08-06
Payer: MEDICARE

## 2024-08-06 DIAGNOSIS — L82.1 SEBORRHEIC KERATOSIS: ICD-10-CM

## 2024-08-06 DIAGNOSIS — D22.9 MULTIPLE BENIGN NEVI: Primary | ICD-10-CM

## 2024-08-06 PROCEDURE — 99213 OFFICE O/P EST LOW 20 MIN: CPT | Performed by: STUDENT IN AN ORGANIZED HEALTH CARE EDUCATION/TRAINING PROGRAM

## 2024-08-06 PROCEDURE — 1159F MED LIST DOCD IN RCRD: CPT | Performed by: STUDENT IN AN ORGANIZED HEALTH CARE EDUCATION/TRAINING PROGRAM

## 2024-08-06 PROCEDURE — 1157F ADVNC CARE PLAN IN RCRD: CPT | Performed by: STUDENT IN AN ORGANIZED HEALTH CARE EDUCATION/TRAINING PROGRAM

## 2024-08-06 ASSESSMENT — DERMATOLOGY PATIENT ASSESSMENT: DO YOU HAVE ANY NEW OR CHANGING LESIONS: NO

## 2024-08-06 ASSESSMENT — PATIENT GLOBAL ASSESSMENT (PGA): PATIENT GLOBAL ASSESSMENT: PATIENT GLOBAL ASSESSMENT:  1 - CLEAR

## 2024-08-06 ASSESSMENT — ITCH NUMERIC RATING SCALE: HOW SEVERE IS YOUR ITCHING?: 0

## 2024-08-06 ASSESSMENT — DERMATOLOGY QUALITY OF LIFE (QOL) ASSESSMENT
RATE HOW BOTHERED YOU ARE BY EFFECTS OF YOUR SKIN PROBLEMS ON YOUR ACTIVITIES (EG, GOING OUT, ACCOMPLISHING WHAT YOU WANT, WORK ACTIVITIES OR YOUR RELATIONSHIPS WITH OTHERS): 0 - NEVER BOTHERED
RATE HOW BOTHERED YOU ARE BY SYMPTOMS OF YOUR SKIN PROBLEM (EG, ITCHING, STINGING BURNING, HURTING OR SKIN IRRITATION): 0 - NEVER BOTHERED
DATE THE QUALITY-OF-LIFE ASSESSMENT WAS COMPLETED: 67058
ARE THERE EXCLUSIONS OR EXCEPTIONS FOR THE QUALITY OF LIFE ASSESSMENT: NO
RATE HOW EMOTIONALLY BOTHERED YOU ARE BY YOUR SKIN PROBLEM (FOR EXAMPLE, WORRY, EMBARRASSMENT, FRUSTRATION): 0 - NEVER BOTHERED

## 2024-08-06 NOTE — PROGRESS NOTES
Subjective     Paulina Knutson is a 73 y.o. female who presents for the following: Skin Check.     Review of Systems:  No other skin or systemic complaints other than what is documented elsewhere in the note.    The following portions of the chart were reviewed this encounter and updated as appropriate:         Skin Cancer History  No skin cancer on file.      Specialty Problems          Dermatology Problems    Hemangioma of skin and subcutaneous tissue    Melanocytic nevi of other parts of face    Melanocytic nevi of trunk    Melanocytic nevi of unspecified lower limb, including hip    Melanocytic nevi of unspecified upper limb, including shoulder    Neoplasm of uncertain behavior of skin    Other benign neoplasm of skin of left lower limb, including hip    Other benign neoplasm of skin, unspecified    Other melanin hyperpigmentation    Other seborrheic keratosis    Other specified follicular disorders    Scar condition and fibrosis of skin        Objective   Well appearing patient in no apparent distress; mood and affect are within normal limits.    A full examination was performed including scalp, head, eyes, ears, nose, lips, neck, chest, axillae, abdomen, back, buttocks, bilateral upper extremities, bilateral lower extremities, hands, feet, fingers, toes, fingernails, and toenails. All findings within normal limits unless otherwise noted below.    Assessment/Plan   1. Multiple benign nevi    Exam findings: Benign appearing macules and papules  Plan: monitor for any new or changing nevi. Notify me should this occur.  Over the counter use of sun screen product (30+ SPF with mineral sun screen) recommended      2. Seborrheic keratosis  Seborrheic papules on trunk    reassured

## 2024-08-26 PROBLEM — Z08 ENCOUNTER FOR FOLLOW-UP SURVEILLANCE OF BREAST CANCER: Status: ACTIVE | Noted: 2024-08-26

## 2024-08-26 PROBLEM — Z78.0 MENOPAUSE: Status: ACTIVE | Noted: 2024-08-26

## 2024-08-26 PROBLEM — Z85.3 ENCOUNTER FOR FOLLOW-UP SURVEILLANCE OF BREAST CANCER: Status: ACTIVE | Noted: 2024-08-26

## 2024-08-26 NOTE — PROGRESS NOTES
Patient ID: Paulina Knutson is a 73 y.o. female.  BREAST CANCER DIAGNOSIS:    AJCC Edition: 8th (AJCC), Diagnosis Date: May 2021, IA, pT1c pN0 cM0 G1     Treatment Synopsis:    -3/24/21: Bilateral screening mammogram revealed a focal asymmetry in the left breast.  -3/25/21: Dx MG/US confirmed a 0.5 x 0.6 x 0.4 cm irregular hypoechoic mass at 3:00, 7 cm FN. A small irregularly hypoechoic mass was seen at 2:00, 7 cm FN, possibly congruent with other lesion. Left axilla appeared unremarkable.  -21: US-guided CNB at 2:00 revealed IDC, grade 1-2, with associated microcalcifications; ER >95%, VT 65%, Her2-negative (1+ IHC). Mammaprint assay low-risk (+0.754), luminal A type.  -21: Left lumpectomy/SLNBx showed 1.8 cm of grade 1 IDC, no LVI, 0/2 SLN’s involved. Margins negative.  -Adjuvant RT 21-21 (5 fractions).  -Adjuvant anastrozole initiated late 2021.  -2024 Self directed completion of anastrozole due to vertigo        Past Medical History: Paulina has a past medical history of Other conditions influencing health status, Other conditions influencing health status, and Personal history of urinary (tract) infections (11/10/2021).  Surgical History:  Paulina has a past surgical history that includes Breast biopsy (11/10/2021); Tonsillectomy (2021); Colonoscopy (2021);  section, classic (11/10/2021); and Breast lumpectomy (Left, ).  Social History:  Paulina reports that she has never smoked. She has never used smokeless tobacco. She reports that she does not currently use alcohol after a past usage of about 2.0 standard drinks of alcohol per week. She reports that she does not use drugs.  Social Substance History:  ·  Social History denies smoking, alcohol and drug use   ·  Smoking Status never smoker   ·  Tobacco Use denies   ·  Alcohol Use occasionally, 1 drink every 2 weeks   ·  Drug Use denies   ·  Additional History       GYN History: Menarche age 13. Menopause age  53. , age 22 at first birth. Breastfeeding some. OCP's none, some use of HRT, no use of fertility drugs.    Family History:  No family history on file.  Family Oncology History:  Cancer-related family history is not on file.    HISTORY OF PRESENT ILLNESS:  Paulina Knutson is a 73 y.o. female who returns today for Breast cancer treatment follow-up and surveillance She completed Anastrozole about 2 months ago after she watched a you tube video about side effects to the drug. Was struggling with dizziness, did complete testing and MRI which was all negative. She had issues with loss of left leg and had some isolated issues with loss of bowel and bladder while on a trip in [place]. She is not willing to consider any additional medication for estrogen blocking at this time but would prefer more frequent screening.  She denies any breast cancer concerns today. She will be in [place] from  through Dec 4th.      She denies any severe headaches, no vision changes, falls, dizziness or balance.      She denies any new or unexplained bone aches or pains. She reports essentially resolve of cramped legs.      She denies any chest pain or breathing issues, no cough or short of breath.     She denies any skin lesions or masses, oral sores lesions or infections. She continues to follow-up with derm annually for skin checks.      She reports a normal appetite and normal bowel movements, normal urination.     She has baseline sleep issues, is up in the middle of the night. She denies any fatigue despite this, does not need naps.      She continues to take to take daily Vit D and Calcium. She is exercising most days with walking, machine weights 3-4 times per week at her gym    Review of Systems  ROS 14 points performed, See HPI for exceptions      OBJECTIVE:    VS / Pain:  /81 (BP Location: Right arm, Patient Position: Sitting, BP Cuff Size: Small adult)   Pulse 75   Temp 36.7 °C (98.1 °F) (Temporal)   Wt 54.3 kg  (119 lb 13.1 oz)   BMI 19.64 kg/m²   BSA: 1.58 meters squared   Pain Scale: 0  ECO- Fully active, able to carry on all pre-disease performance w/o restriction.         Physical Exam  Constitutional: Well developed, awake/alert/oriented x4, no distress, alert and cooperative  EYES: Sclera clear  ENMT: mucous membranes moist, no apparent injury, no lesions seen  Head/Neck: Neck supple, no apparent injury, thyroid without mass or tenderness, No JVD, trachea midline, no bruits  Respiratory / Thoracic: Patent airways, clear to all lobes, normal breath sounds with good chest expansion, thorax symmetric.  Cardiovascular: Regular, rate and rhythm, no murmurs, 2+ equal pulses of the extremities, normal auscultated S 1and S 2  GI: Nondistended, soft, non-tender, no rebound tenderness or guarding, no masses palpable, no organomegaly, +BS, no bruits  Musculoskeletal: ROM intact, no joint swelling, normal strength, no spinal tenderness  Extremities: normal extremities, no cyanosis edema, contusions or wounds, no clubbing  Neurological: alert and oriented x4, intact senses, motor, response and reflexes, normal strength  Breast: Left breast s/p lumpectomy. Bilateral breasts free of any palpable masses or lesions   Lymphatic: No cervical, supraclavicular, infraclavicular or axillary lymphadenopathy  Psychological: Appropriate and talkative mood and behavior  Skin: Warm and dry, no lesions, no rashes, no jaundice      Diagnostic Results   MR brain w and wo IV contrast, MR angio head w and wo IV contrast  Narrative: Interpreted By:  Jessee Morgan,   STUDY:  MR ANGIO HEAD W AND WO IV CONTRAST; MR BRAIN W AND WO IV CONTRAST;  2024 9:30 am; 2024 8:42 am      INDICATION:  Signs/Symptoms:Vertigo.      COMPARISON:  MRI brain dated 2022. MRI IAC dated 01/10/2022.      ACCESSION NUMBER(S):  [id number]; [id number]      ORDERING CLINICIAN:  ROB BEACH      TECHNIQUE:  1) Standard multiplanar multisequence MR imaging  was performed  through the brain prior to and following administration of 11 mL  Dotarem intravenous contrast.  2) Noncontrast 3D time-of-flight MRA imaging was performed through  the brain. Contrast-enhanced MRA of the head was also obtained.      FINDINGS:  BRAIN:      Parenchyma: There is no diffusion restriction abnormality to suggest  acute infarct.  No evidence of recent hemorrhage. There is no mass  effect or midline shift. There is an unchanged 3 mm focus of  enhancement within the dorsal leftward aspect of the asher (series 13,  image 40), unchanged in appearance dating back to 01/10/2022 MRI  brain/IAC examination. There is associated GRE hypointensity with the  lesion. There is otherwise no abnormal parenchymal or leptomeningeal  enhancement within the brain parenchyma. Mild chronic small vessel  ischemic disease suggested.      CSF Spaces: The ventricles, sulci and basal cisterns are stable in  appearance with senescent change. Basilar cisterns are patent.      Extra-axial spaces: No extra-axial fluid collection.      Paranasal Sinuses: Mucosal thickening versus dependent fluid within  the right maxillary paranasal sinus without significant  opacification. Mild diffuse mucosal thickening of the bilateral  ethmoids.      Mastoids: Fluid within the right mastoid with trace fluid involving a  few left mastoid air cells.      Orbits: Normal.      Calvarium: No suspicious osseous marrow signal.          VASCULAR FINDINGS:      Internal carotid arteries: Normal flow signal bilaterally.      Anterior cerebral arteries: Azygous configuration suggested.  Otherwise normal flow signal bilaterally.      Middle cerebral arteries: Normal flow signal bilaterally.      Vertebral arteries: Normal flow signal bilaterally.      Basilar artery: Normal flow signal.      Posterior communicating arteries: Visualized on the left, diminutive  versus absent on the right.      Posterior cerebral arteries: Fetal versus near fetal  origin on the  left. Normal flow signal bilaterally.      Limited assessment of the neck vasculature demonstrates tortuosity of  the left-greater-than-right cervical internal carotid arteries.      Impression: Stable MR appearance of the brain. No acute infarct, recent  hemorrhage, or intracranial mass effect. Unchanged 3 mm diameter  focus of enhancement within the dorsal leftward aspect of the asher  with associated GRE hypointensity that may reflect a tiny slow flow  vascular malformation such as cavernoma or telangiectasia. This is  not appreciably changed dating back to 2022 examination. Otherwise no  abnormal intracranial enhancement.      No evidence of intracranial source vessel arterial occlusion, flow  significant stenosis, or aneurysm.      Similar chronic small vessel ischemic disease and senescent change.      MACRO:  None      Signed by: Jessee Morgan 5/24/2024 11:54 AM  Dictation workstation:   [id number]       BI mammo bilateral screening tomosynthesis 04/11/2024    Narrative  Interpreted By:  Fidel Celis,  STUDY:  BI MAMMO BILATERAL SCREENING TOMOSYNTHESIS;  4/11/2024 9:43 am    ACCESSION NUMBER(S):  [id number]    ORDERING CLINICIAN:  SHARMAINE STEWART    INDICATION:  Screening. History of left breast cancer status post lumpectomy.    COMPARISON:  04/10/2023 and 04/07/2022    FINDINGS:  2D and tomosynthesis images were reviewed at 1 mm slice thickness.    Density:  The breast tissue is heterogeneously dense, which may  obscure small masses.    Stable postsurgical scarring with associated surgical clips in the  superolateral left breast at posterior depth and overlying the left  axilla. No suspicious masses or calcifications are identified.    Impression  No mammographic evidence of malignancy.    BI-RADS CATEGORY:    BI-RADS Category:  2 Benign.  Recommendation:  Annual Screening.  Recommended Date:  1 Year.  Laterality:  Bilateral.    For any future breast imaging appointments, please call  [phone]  ([phone]).      MACRO:  None    Signed by: Fidel Celis 4/11/2024 9:50 AM  Dictation workstation:   [id number]     === 12/05/23 ===    DEXA BONE DENSITY    - Impression -  DEXA:  According to World Health Organization criteria,  classification is low bone mass (osteopenia)    Followup recommended in two years or sooner as clinically warranted.    All images and detailed analysis are available on the  Radiology  PACS.    MACRO:  None    Signed by: Emily Mckeon 12/5/2023 10:58 AM  Dictation workstation:   [id number]    No visits with results within 6 Week(s) from this visit.   Latest known visit with results is:   Lab on 06/12/2024   Component Date Value Ref Range Status    WBC 06/12/2024 13.2 (H)  4.4 - 11.3 x10*3/uL Final    nRBC 06/12/2024 0.0  0.0 - 0.0 /100 WBCs Final    RBC 06/12/2024 4.80  4.00 - 5.20 x10*6/uL Final    Hemoglobin 06/12/2024 15.1  12.0 - 16.0 g/dL Final    Hematocrit 06/12/2024 45.5  36.0 - 46.0 % Final    MCV 06/12/2024 95  80 - 100 fL Final    MCH 06/12/2024 31.5  26.0 - 34.0 pg Final    MCHC 06/12/2024 33.2  32.0 - 36.0 g/dL Final    RDW 06/12/2024 12.9  11.5 - 14.5 % Final    Platelets 06/12/2024 210  150 - 450 x10*3/uL Final    Neutrophils % 06/12/2024 84.5  40.0 - 80.0 % Final    Immature Granulocytes %, Automated 06/12/2024 0.4  0.0 - 0.9 % Final    Lymphocytes % 06/12/2024 9.0  13.0 - 44.0 % Final    Monocytes % 06/12/2024 5.2  2.0 - 10.0 % Final    Eosinophils % 06/12/2024 0.4  0.0 - 6.0 % Final    Basophils % 06/12/2024 0.5  0.0 - 2.0 % Final    Neutrophils Absolute 06/12/2024 11.14 (H)  1.60 - 5.50 x10*3/uL Final    Immature Granulocytes Absolute, Au* 06/12/2024 0.05  0.00 - 0.50 x10*3/uL Final    Lymphocytes Absolute 06/12/2024 1.19  0.80 - 3.00 x10*3/uL Final    Monocytes Absolute 06/12/2024 0.68  0.05 - 0.80 x10*3/uL Final    Eosinophils Absolute 06/12/2024 0.05  0.00 - 0.40 x10*3/uL Final    Basophils Absolute 06/12/2024 0.06  0.00 - 0.10 x10*3/uL Final     Hemoglobin A1C 06/12/2024 5.7 (H)  see below % Final    Estimated Average Glucose 06/12/2024 117  Not Established mg/dL Final    Glucose 06/12/2024 119 (H)  74 - 99 mg/dL Final    Sodium 06/12/2024 141  136 - 145 mmol/L Final    Potassium 06/12/2024 4.5  3.5 - 5.3 mmol/L Final    Chloride 06/12/2024 102  98 - 107 mmol/L Final    Bicarbonate 06/12/2024 32  21 - 32 mmol/L Final    Anion Gap 06/12/2024 12  10 - 20 mmol/L Final    Urea Nitrogen 06/12/2024 20  6 - 23 mg/dL Final    Creatinine 06/12/2024 0.86  0.50 - 1.05 mg/dL Final    eGFR 06/12/2024 71  >60 mL/min/1.73m*2 Final    Calcium 06/12/2024 10.0  8.6 - 10.6 mg/dL Final    Albumin 06/12/2024 4.5  3.4 - 5.0 g/dL Final    Alkaline Phosphatase 06/12/2024 79  33 - 136 U/L Final    Total Protein 06/12/2024 6.5  6.4 - 8.2 g/dL Final    AST 06/12/2024 14  9 - 39 U/L Final    Bilirubin, Total 06/12/2024 0.9  0.0 - 1.2 mg/dL Final    ALT 06/12/2024 10  7 - 45 U/L Final    Thyroid Stimulating Hormone 06/12/2024 1.44  0.44 - 3.98 mIU/L Final    Sedimentation Rate 06/12/2024 10  0 - 30 mm/h Final    Color, Urine 06/12/2024 Yellow  Straw, Yellow Final    Appearance, Urine 06/12/2024 Clear  Clear Final    Specific Gravity, Urine 06/12/2024 1.020  1.005 - 1.035 Final    pH, Urine 06/12/2024 6.5  5.0, 5.5, 6.0, 6.5, 7.0, 7.5, 8.0 Final    Protein, Urine 06/12/2024 30 (1+) (A)  NEGATIVE, 10 (TRACE) mg/dL Final    Glucose, Urine 06/12/2024 NEGATIVE  NEGATIVE mg/dL Final    Blood, Urine 06/12/2024 1.0 (3+) (A)  NEGATIVE Final    Ketones, Urine 06/12/2024 NEGATIVE  NEGATIVE mg/dL Final    Bilirubin, Urine 06/12/2024 NEGATIVE  NEGATIVE Final    Urobilinogen, Urine 06/12/2024 NORM  NORM mg/dL Final    Nitrite, Urine 06/12/2024 NEGATIVE  NEGATIVE Final    Leukocyte Esterase, Urine 06/12/2024 500 Chemo/µL (A)  NEGATIVE Final    Urine Culture 06/12/2024 >100,000 Escherichia coli (A)   Final    WBC, Urine 06/12/2024 NONE  1-5, NONE /HPF Final    RBC, Urine 06/12/2024 NONE  NONE, 1-2, 3-5  "/[facility] Final        Assessment/Plan   Invasive ductal carcinoma of breast (Multi), Pathologic: Stage IA (pT1c, pN0, cM0, G1, ER+, ND+, HER2-)  Diagnoses and all orders for this visit:  Infiltrating ductal carcinoma of left breast (Multi) (Primary)  Status post left breast lumpectomy  Osteopenia, unspecified location  Menopause  Encounter for follow-up surveillance of breast cancer    Problem List Items Addressed This Visit       Invasive ductal carcinoma of breast (Multi) - Primary    Osteopenia    Status post left breast lumpectomy    Menopause    Encounter for follow-up surveillance of breast cancer    1. Left breast cancer. Stage IA (pT1c pN0 cM0), grade 1 IDC, ER/ND+, Her2-negative. She is s/p lumpectomy and SLNBx. Adjuvant RT completed  7/2/21. Anastrozole initiated 7/2021.  - Patient doing well today without any concerning symptoms or exam findings. Self directed stop of anastrozole due to concerning symptoms that she worked up with PCP; all testing negative. She is declining any further anti-estrogen therapy and would like \"more frequent surveillance.\" Given her Dense breast tissue and hx of breast cancer she is in agreement to proceed with rotational use breast MRI. Planned to completed prior to the end of the year. We have reviewed CTS-5 for recurrence risk in the 5-10 year period is 4.% correlating with low risk. We have reviewed I still recommend anti-estrogen for risk reduction for 5 years total and she will not take it past the 3 she has completed (June / July 2024)  - She will rtc in 1 yr with me, continued shared visits.   - Mammogram due 4/2024 with surgical team.  -Will continue in follow-up with radiation oncology as well     There is no evidence on clinical exam today for breast cancer recurrence      2. Osteopenia  Dexa completed Sept 2021 showing T-score -2.0, Dec 2023 T-score -2.4.  She has declined Bisphosphonate therapy with Zometa.         General Health maintenance  PCP Dr Acosta annually and " as needed         At least 35 minutes of direct consultation was spent with the patient today reviewing her cancer care plan, educating and answering questions regarding ongoing follow up, greater than 50% in counseling and coordination of care.    Treatment Plan:  [No matching plan found]          Thank you for the opportunity to be involved in the care of Paulina Knutson. We discussed the clinical significance of diagnosis, goals of care and treatment plan in detail. Please do not hesitate to reach out with any questions. Thank you.     -------------------------------------------------------------------------------------------------------------------------------  Cara Saldivar MSN, APRN, FNP-C  [facility]  Division of Medical Oncology- Breast   Collaborating Physician Dr. Main Bryant   Team Nurse Partners Angela Pradhan, Itzel V[place] [address]  Phone: [phone]  Fax: [phone]  Available via Secure Chat    Confidential Peer Review Document-  Privilege  Privileged Pursuant to [place] Revised Code Section 2305.24, .25, .251 & .252

## 2024-08-27 ENCOUNTER — OFFICE VISIT (OUTPATIENT)
Dept: HEMATOLOGY/ONCOLOGY | Facility: CLINIC | Age: 74
End: 2024-08-27
Payer: MEDICARE

## 2024-08-27 ENCOUNTER — APPOINTMENT (OUTPATIENT)
Dept: PRIMARY CARE | Facility: CLINIC | Age: 74
End: 2024-08-27
Payer: MEDICARE

## 2024-08-27 VITALS
DIASTOLIC BLOOD PRESSURE: 81 MMHG | HEART RATE: 75 BPM | WEIGHT: 119.82 LBS | TEMPERATURE: 98.1 F | BODY MASS INDEX: 19.64 KG/M2 | SYSTOLIC BLOOD PRESSURE: 158 MMHG

## 2024-08-27 DIAGNOSIS — C50.912 INFILTRATING DUCTAL CARCINOMA OF LEFT BREAST (MULTI): Primary | ICD-10-CM

## 2024-08-27 DIAGNOSIS — M85.80 OSTEOPENIA, UNSPECIFIED LOCATION: ICD-10-CM

## 2024-08-27 DIAGNOSIS — Z85.3 ENCOUNTER FOR FOLLOW-UP SURVEILLANCE OF BREAST CANCER: ICD-10-CM

## 2024-08-27 DIAGNOSIS — R92.2 DENSE BREASTS: ICD-10-CM

## 2024-08-27 DIAGNOSIS — Z98.890 STATUS POST LEFT BREAST LUMPECTOMY: ICD-10-CM

## 2024-08-27 DIAGNOSIS — R92.30 DENSE BREASTS: ICD-10-CM

## 2024-08-27 DIAGNOSIS — Z08 ENCOUNTER FOR FOLLOW-UP SURVEILLANCE OF BREAST CANCER: ICD-10-CM

## 2024-08-27 DIAGNOSIS — Z78.0 MENOPAUSE: ICD-10-CM

## 2024-08-27 PROCEDURE — 99215 OFFICE O/P EST HI 40 MIN: CPT | Performed by: NURSE PRACTITIONER

## 2024-08-27 PROCEDURE — 1159F MED LIST DOCD IN RCRD: CPT | Performed by: NURSE PRACTITIONER

## 2024-08-27 PROCEDURE — 1126F AMNT PAIN NOTED NONE PRSNT: CPT | Performed by: NURSE PRACTITIONER

## 2024-08-27 PROCEDURE — 1160F RVW MEDS BY RX/DR IN RCRD: CPT | Performed by: NURSE PRACTITIONER

## 2024-08-27 PROCEDURE — 1157F ADVNC CARE PLAN IN RCRD: CPT | Performed by: NURSE PRACTITIONER

## 2024-08-27 ASSESSMENT — PATIENT HEALTH QUESTIONNAIRE - PHQ9
1. LITTLE INTEREST OR PLEASURE IN DOING THINGS: NOT AT ALL
SUM OF ALL RESPONSES TO PHQ9 QUESTIONS 1 & 2: 0
2. FEELING DOWN, DEPRESSED OR HOPELESS: NOT AT ALL

## 2024-08-27 ASSESSMENT — PAIN SCALES - GENERAL: PAINLEVEL: 0-NO PAIN

## 2024-08-27 NOTE — PATIENT INSTRUCTIONS
Cara DAY, CNP August 2025     Given your decision to stop anastrozole and dense breast tissue I have ordered breast MRI. I will call you once this has resulted - please complete this before the end of the year      Juanis Sorensen CNP radiation oncology  1/17/2025     Ashley Madrid CNP Breast surgery follow-up annually with mammogram same day April 14 2025     PCP Dr Granados annually and as needed.      Dec 2024 Bone Density showed worsening osteopenia.      GYN Dr. Richie Christopher follow-up as scheduled      Call with any questions, concerns or treatment intolerance prior to next office visit [phone].

## 2024-10-28 ENCOUNTER — HOSPITAL ENCOUNTER (OUTPATIENT)
Dept: RADIOLOGY | Facility: HOSPITAL | Age: 74
Discharge: HOME | End: 2024-10-28
Payer: MEDICARE

## 2024-10-28 DIAGNOSIS — R92.2 DENSE BREASTS: ICD-10-CM

## 2024-10-28 DIAGNOSIS — R92.30 DENSE BREASTS: ICD-10-CM

## 2024-10-28 DIAGNOSIS — C50.912 INFILTRATING DUCTAL CARCINOMA OF LEFT BREAST (MULTI): ICD-10-CM

## 2024-10-28 PROCEDURE — 2550000001 HC RX 255 CONTRASTS: Performed by: NURSE PRACTITIONER

## 2024-10-28 PROCEDURE — 77049 MRI BREAST C-+ W/CAD BI: CPT | Performed by: RADIOLOGY

## 2024-10-28 PROCEDURE — 77049 MRI BREAST C-+ W/CAD BI: CPT

## 2024-10-28 PROCEDURE — A9575 INJ GADOTERATE MEGLUMI 0.1ML: HCPCS | Performed by: NURSE PRACTITIONER

## 2024-10-28 RX ORDER — GADOTERATE MEGLUMINE 376.9 MG/ML
11 INJECTION INTRAVENOUS
Status: COMPLETED | OUTPATIENT
Start: 2024-10-28 | End: 2024-10-28

## 2024-10-29 ENCOUNTER — TELEPHONE (OUTPATIENT)
Dept: HEMATOLOGY/ONCOLOGY | Facility: HOSPITAL | Age: 74
End: 2024-10-29
Payer: MEDICARE

## 2024-10-29 DIAGNOSIS — C50.912 MALIGNANT NEOPLASM OF LEFT BREAST IN FEMALE, ESTROGEN RECEPTOR POSITIVE, UNSPECIFIED SITE OF BREAST: Primary | ICD-10-CM

## 2024-10-29 DIAGNOSIS — Z17.0 MALIGNANT NEOPLASM OF LEFT BREAST IN FEMALE, ESTROGEN RECEPTOR POSITIVE, UNSPECIFIED SITE OF BREAST: Primary | ICD-10-CM

## 2024-10-29 DIAGNOSIS — R92.8 ABNORMAL MAGNETIC RESONANCE IMAGING OF RIGHT BREAST: ICD-10-CM

## 2024-10-29 DIAGNOSIS — E78.2 MIXED HYPERLIPIDEMIA: ICD-10-CM

## 2024-10-29 RX ORDER — ATORVASTATIN CALCIUM 10 MG/1
10 TABLET, FILM COATED ORAL DAILY
Qty: 90 TABLET | Refills: 3 | Status: SHIPPED | OUTPATIENT
Start: 2024-10-29

## 2024-11-04 ENCOUNTER — APPOINTMENT (OUTPATIENT)
Dept: RADIOLOGY | Facility: CLINIC | Age: 74
End: 2024-11-04
Payer: MEDICARE

## 2024-11-06 ENCOUNTER — HOSPITAL ENCOUNTER (OUTPATIENT)
Dept: RADIOLOGY | Facility: CLINIC | Age: 74
Discharge: HOME | End: 2024-11-06
Payer: MEDICARE

## 2024-11-06 DIAGNOSIS — R92.8 ABNORMAL MAGNETIC RESONANCE IMAGING OF RIGHT BREAST: ICD-10-CM

## 2024-11-06 DIAGNOSIS — C50.912 MALIGNANT NEOPLASM OF LEFT BREAST IN FEMALE, ESTROGEN RECEPTOR POSITIVE, UNSPECIFIED SITE OF BREAST: ICD-10-CM

## 2024-11-06 DIAGNOSIS — Z17.0 MALIGNANT NEOPLASM OF LEFT BREAST IN FEMALE, ESTROGEN RECEPTOR POSITIVE, UNSPECIFIED SITE OF BREAST: ICD-10-CM

## 2024-11-06 PROCEDURE — 77065 DX MAMMO INCL CAD UNI: CPT | Mod: RIGHT SIDE | Performed by: STUDENT IN AN ORGANIZED HEALTH CARE EDUCATION/TRAINING PROGRAM

## 2024-11-06 PROCEDURE — G0279 TOMOSYNTHESIS, MAMMO: HCPCS | Mod: RIGHT SIDE | Performed by: STUDENT IN AN ORGANIZED HEALTH CARE EDUCATION/TRAINING PROGRAM

## 2024-11-06 PROCEDURE — 77061 BREAST TOMOSYNTHESIS UNI: CPT | Mod: RT

## 2024-11-06 PROCEDURE — 76642 ULTRASOUND BREAST LIMITED: CPT | Mod: RIGHT SIDE | Performed by: STUDENT IN AN ORGANIZED HEALTH CARE EDUCATION/TRAINING PROGRAM

## 2024-11-06 PROCEDURE — 76642 ULTRASOUND BREAST LIMITED: CPT | Mod: RT

## 2024-11-06 PROCEDURE — 76982 USE 1ST TARGET LESION: CPT | Mod: RT

## 2024-11-07 ENCOUNTER — LAB (OUTPATIENT)
Dept: LAB | Facility: LAB | Age: 74
End: 2024-11-07
Payer: MEDICARE

## 2024-11-07 ENCOUNTER — TELEPHONE (OUTPATIENT)
Dept: HEMATOLOGY/ONCOLOGY | Facility: HOSPITAL | Age: 74
End: 2024-11-07
Payer: MEDICARE

## 2024-11-07 DIAGNOSIS — R92.8 ABNORMAL MAGNETIC RESONANCE IMAGING OF RIGHT BREAST: Primary | ICD-10-CM

## 2024-11-07 DIAGNOSIS — Z17.0 MALIGNANT NEOPLASM OF LEFT BREAST IN FEMALE, ESTROGEN RECEPTOR POSITIVE, UNSPECIFIED SITE OF BREAST: ICD-10-CM

## 2024-11-07 DIAGNOSIS — C50.912 MALIGNANT NEOPLASM OF LEFT BREAST IN FEMALE, ESTROGEN RECEPTOR POSITIVE, UNSPECIFIED SITE OF BREAST: ICD-10-CM

## 2024-11-07 DIAGNOSIS — Z00.00 ANNUAL PHYSICAL EXAM: ICD-10-CM

## 2024-11-07 DIAGNOSIS — R92.8 ABNORMAL MAGNETIC RESONANCE IMAGING OF RIGHT BREAST: ICD-10-CM

## 2024-11-07 LAB
ALBUMIN SERPL BCP-MCNC: 4.7 G/DL (ref 3.4–5)
ALP SERPL-CCNC: 78 U/L (ref 33–136)
ALT SERPL W P-5'-P-CCNC: 13 U/L (ref 7–45)
ANION GAP SERPL CALC-SCNC: 10 MMOL/L (ref 10–20)
AST SERPL W P-5'-P-CCNC: 17 U/L (ref 9–39)
BILIRUB SERPL-MCNC: 0.7 MG/DL (ref 0–1.2)
BUN SERPL-MCNC: 17 MG/DL (ref 6–23)
CALCIUM SERPL-MCNC: 9.8 MG/DL (ref 8.6–10.3)
CHLORIDE SERPL-SCNC: 106 MMOL/L (ref 98–107)
CO2 SERPL-SCNC: 33 MMOL/L (ref 21–32)
CREAT SERPL-MCNC: 0.74 MG/DL (ref 0.5–1.05)
EGFRCR SERPLBLD CKD-EPI 2021: 84 ML/MIN/1.73M*2
GLUCOSE SERPL-MCNC: 148 MG/DL (ref 74–99)
POTASSIUM SERPL-SCNC: 4.5 MMOL/L (ref 3.5–5.3)
PROT SERPL-MCNC: 6.6 G/DL (ref 6.4–8.2)
SODIUM SERPL-SCNC: 144 MMOL/L (ref 136–145)

## 2024-11-07 PROCEDURE — 80053 COMPREHEN METABOLIC PANEL: CPT

## 2024-11-07 PROCEDURE — 36415 COLL VENOUS BLD VENIPUNCTURE: CPT

## 2024-11-07 NOTE — TELEPHONE ENCOUNTER
Call to patient to review updated right breast imaging and radiology continued opinion to complete CT chest contrast. Pt is agreeable, will complete labs today.

## 2024-11-08 ENCOUNTER — LAB (OUTPATIENT)
Dept: LAB | Facility: LAB | Age: 74
End: 2024-11-08
Payer: MEDICARE

## 2024-11-08 DIAGNOSIS — Z00.00 ANNUAL PHYSICAL EXAM: ICD-10-CM

## 2024-11-08 LAB
25(OH)D3 SERPL-MCNC: 48 NG/ML (ref 30–100)
ALBUMIN SERPL BCP-MCNC: 4.4 G/DL (ref 3.4–5)
ALP SERPL-CCNC: 72 U/L (ref 33–136)
ALT SERPL W P-5'-P-CCNC: 12 U/L (ref 7–45)
ANION GAP SERPL CALC-SCNC: 12 MMOL/L (ref 10–20)
APPEARANCE UR: CLEAR
AST SERPL W P-5'-P-CCNC: 17 U/L (ref 9–39)
BASOPHILS # BLD AUTO: 0.08 X10*3/UL (ref 0–0.1)
BASOPHILS NFR BLD AUTO: 1.2 %
BILIRUB SERPL-MCNC: 0.8 MG/DL (ref 0–1.2)
BILIRUB UR STRIP.AUTO-MCNC: NEGATIVE MG/DL
BUN SERPL-MCNC: 19 MG/DL (ref 6–23)
CALCIUM SERPL-MCNC: 9.8 MG/DL (ref 8.6–10.6)
CHLORIDE SERPL-SCNC: 101 MMOL/L (ref 98–107)
CHOLEST SERPL-MCNC: 171 MG/DL (ref 0–199)
CHOLESTEROL/HDL RATIO: 2.8
CO2 SERPL-SCNC: 32 MMOL/L (ref 21–32)
COLOR UR: COLORLESS
CREAT SERPL-MCNC: 0.77 MG/DL (ref 0.5–1.05)
EGFRCR SERPLBLD CKD-EPI 2021: 80 ML/MIN/1.73M*2
EOSINOPHIL # BLD AUTO: 0.17 X10*3/UL (ref 0–0.4)
EOSINOPHIL NFR BLD AUTO: 2.5 %
ERYTHROCYTE [DISTWIDTH] IN BLOOD BY AUTOMATED COUNT: 13 % (ref 11.5–14.5)
EST. AVERAGE GLUCOSE BLD GHB EST-MCNC: 114 MG/DL
GLUCOSE SERPL-MCNC: 92 MG/DL (ref 74–99)
GLUCOSE UR STRIP.AUTO-MCNC: NORMAL MG/DL
HBA1C MFR BLD: 5.6 %
HCT VFR BLD AUTO: 45.2 % (ref 36–46)
HCV AB SER QL: NONREACTIVE
HDLC SERPL-MCNC: 61.6 MG/DL
HGB BLD-MCNC: 14.7 G/DL (ref 12–16)
IMM GRANULOCYTES # BLD AUTO: 0.02 X10*3/UL (ref 0–0.5)
IMM GRANULOCYTES NFR BLD AUTO: 0.3 % (ref 0–0.9)
KETONES UR STRIP.AUTO-MCNC: NEGATIVE MG/DL
LDLC SERPL CALC-MCNC: 90 MG/DL
LEUKOCYTE ESTERASE UR QL STRIP.AUTO: NEGATIVE
LYMPHOCYTES # BLD AUTO: 1.48 X10*3/UL (ref 0.8–3)
LYMPHOCYTES NFR BLD AUTO: 22 %
MCH RBC QN AUTO: 31.6 PG (ref 26–34)
MCHC RBC AUTO-ENTMCNC: 32.5 G/DL (ref 32–36)
MCV RBC AUTO: 97 FL (ref 80–100)
MONOCYTES # BLD AUTO: 0.52 X10*3/UL (ref 0.05–0.8)
MONOCYTES NFR BLD AUTO: 7.7 %
NEUTROPHILS # BLD AUTO: 4.47 X10*3/UL (ref 1.6–5.5)
NEUTROPHILS NFR BLD AUTO: 66.3 %
NITRITE UR QL STRIP.AUTO: NEGATIVE
NON HDL CHOLESTEROL: 109 MG/DL (ref 0–149)
NRBC BLD-RTO: 0 /100 WBCS (ref 0–0)
PH UR STRIP.AUTO: 7 [PH]
PLATELET # BLD AUTO: 213 X10*3/UL (ref 150–450)
POTASSIUM SERPL-SCNC: 4.1 MMOL/L (ref 3.5–5.3)
PROT SERPL-MCNC: 6.3 G/DL (ref 6.4–8.2)
PROT UR STRIP.AUTO-MCNC: NEGATIVE MG/DL
RBC # BLD AUTO: 4.65 X10*6/UL (ref 4–5.2)
RBC # UR STRIP.AUTO: NEGATIVE /UL
SODIUM SERPL-SCNC: 141 MMOL/L (ref 136–145)
SP GR UR STRIP.AUTO: 1.01
TRIGL SERPL-MCNC: 97 MG/DL (ref 0–149)
TSH SERPL-ACNC: 3.34 MIU/L (ref 0.44–3.98)
UROBILINOGEN UR STRIP.AUTO-MCNC: NORMAL MG/DL
VLDL: 19 MG/DL (ref 0–40)
WBC # BLD AUTO: 6.7 X10*3/UL (ref 4.4–11.3)

## 2024-11-08 PROCEDURE — 83036 HEMOGLOBIN GLYCOSYLATED A1C: CPT

## 2024-11-08 PROCEDURE — 86803 HEPATITIS C AB TEST: CPT

## 2024-11-08 PROCEDURE — 36415 COLL VENOUS BLD VENIPUNCTURE: CPT

## 2024-11-08 PROCEDURE — 84443 ASSAY THYROID STIM HORMONE: CPT

## 2024-11-08 PROCEDURE — 85025 COMPLETE CBC W/AUTO DIFF WBC: CPT

## 2024-11-08 PROCEDURE — 81003 URINALYSIS AUTO W/O SCOPE: CPT

## 2024-11-08 PROCEDURE — 80061 LIPID PANEL: CPT

## 2024-11-08 PROCEDURE — 82306 VITAMIN D 25 HYDROXY: CPT

## 2024-11-08 PROCEDURE — 80053 COMPREHEN METABOLIC PANEL: CPT

## 2024-11-11 ENCOUNTER — HOSPITAL ENCOUNTER (OUTPATIENT)
Dept: RADIOLOGY | Facility: HOSPITAL | Age: 74
Discharge: HOME | End: 2024-11-11
Payer: MEDICARE

## 2024-11-11 DIAGNOSIS — Z17.0 MALIGNANT NEOPLASM OF LEFT BREAST IN FEMALE, ESTROGEN RECEPTOR POSITIVE, UNSPECIFIED SITE OF BREAST: ICD-10-CM

## 2024-11-11 DIAGNOSIS — C50.912 MALIGNANT NEOPLASM OF LEFT BREAST IN FEMALE, ESTROGEN RECEPTOR POSITIVE, UNSPECIFIED SITE OF BREAST: ICD-10-CM

## 2024-11-11 DIAGNOSIS — R92.8 ABNORMAL MAGNETIC RESONANCE IMAGING OF RIGHT BREAST: ICD-10-CM

## 2024-11-11 PROCEDURE — 71260 CT THORAX DX C+: CPT | Performed by: RADIOLOGY

## 2024-11-11 PROCEDURE — 71260 CT THORAX DX C+: CPT

## 2024-11-11 PROCEDURE — 2550000001 HC RX 255 CONTRASTS: Performed by: NURSE PRACTITIONER

## 2024-11-13 ENCOUNTER — TELEPHONE (OUTPATIENT)
Dept: HEMATOLOGY/ONCOLOGY | Facility: HOSPITAL | Age: 74
End: 2024-11-13
Payer: MEDICARE

## 2024-11-13 DIAGNOSIS — Z17.0 MALIGNANT NEOPLASM OF LEFT BREAST IN FEMALE, ESTROGEN RECEPTOR POSITIVE, UNSPECIFIED SITE OF BREAST: Primary | ICD-10-CM

## 2024-11-13 DIAGNOSIS — C50.912 MALIGNANT NEOPLASM OF LEFT BREAST IN FEMALE, ESTROGEN RECEPTOR POSITIVE, UNSPECIFIED SITE OF BREAST: Primary | ICD-10-CM

## 2024-11-13 DIAGNOSIS — R92.30 DENSE BREAST TISSUE: ICD-10-CM

## 2024-11-13 DIAGNOSIS — R92.8 ABNORMAL MAGNETIC RESONANCE IMAGING OF RIGHT BREAST: ICD-10-CM

## 2024-11-13 NOTE — TELEPHONE ENCOUNTER
Call to patient to review CT- no area found in the right breast. Plan for 6 month MRI follow up with Breast MRI.      CT of lungs showing old scaring / prior infection / current infection. Pt denies feeling ill or fever.     CT is calling out gallbladder changes- she again is asymptomatic.    Pt understands I will update PCP and breast surgery NP.    Orders placed for 6 month MRI follow up    Questions answered.

## 2024-12-04 NOTE — PROGRESS NOTES
Urogynecology  Provider:  Emma Quiroz MD  [phone]        ASSESSMENT AND PLAN:   74 y.o. female being assessed for urethral caruncle and Kodi.    Diagnoses:   #1 Urethral caruncle  #2 Recurrent UTI     Plan:   1. Recurrent UTI   - Sent rx for Keflex 500 mg daily PRN any day that she swims and also a one time 5 day course of Bactrim BID DS in case she should get a UTI while in [place].   - She is overall doing well.   - Continue tv estrogen cream 2x per week. Sent refills to preferred pharmacy.   - All questions answered.     2. Urethral caruncle  - Small and stable on exam, no intervention needed at this time.      Follow-up in 6 months with Dr. uQiroz.     Scribe Attestation:   I, Inez Navarrete, am scribing for virtually, and in the presence of Emma Quiroz MD on 12/05/2024 at 2:48 PM.     Agree with above. I Dr. Quiroz, personally performed the services described in the documentation which was scribed virtually and confirm it is both complete and accurate.  Emma Quiroz MD      Problem List Items Addressed This Visit    None          I spent a total of eConsult Time: 25 minutes in face to face and non face to face time.        Emma Quiroz MD        HISTORY OF PRESENT ILLNESS:   Paulina Knutson is a 74 y.o. female who presents for follow up on Kodi.     Record Review:   Last visit 6/2024  Diagnoses:  #1 Urethral Prolapse  #2 Recurrent UTIs     Plan:  1. Urethral caruncle  - Small and stable on exam, no intervention needed at this time.     2. rUTIs  - Patient has been infection-free with the use of estrogen cream.  - Continue using estrogen cream as prescribed.  - Urine sample negative for nitrites and overall reassuring.     Urinary Symptoms:   - Uses tv estrogen cream 2-3x per week.   - Denies UTI sxs or any recent UTIs.   - She has been limiting her swimming recently.     Interval History:   - Patient had allergy testing since last visit to try and find the cause of her  dizziness. She then found out that Anastrozole was the cause of the dizziness after being on it x2 years. Patient had to get help with walking at times when she was on the Anastrozole. They are now going to monitor her with more frequent surveillance.       Breast Cancer Treatment Synopsis (24 Cara Saldivar NP note)   -3/24/21: Bilateral screening mammogram revealed a focal asymmetry in the left breast.  -3/25/21: Dx MG/US confirmed a 0.5 x 0.6 x 0.4 cm irregular hypoechoic mass at 3:00, 7 cm FN. A small irregularly hypoechoic mass was seen at 2:00, 7 cm FN, possibly congruent with other lesion. Left axilla appeared unremarkable.  -21: US-guided CNB at 2:00 revealed IDC, grade 1-2, with associated microcalcifications; ER >95%, RI 65%, Her2-negative (1+ IHC). Mammaprint assay low-risk (+0.754), luminal A type.  -21: Left lumpectomy/SLNBx showed 1.8 cm of grade 1 IDC, no LVI, 0/2 SLN’s involved. Margins negative.  -Adjuvant RT 21-21 (5 fractions).  -Adjuvant anastrozole initiated late 2021.  -2024 Self directed completion of anastrozole due to vertigo       Past Medical History:      Past Medical History:   Diagnosis Date    Breast cancer (Multi)     Other conditions influencing health status     Urinary Tract Infection    Other conditions influencing health status     Foot pain, unspecified laterality    Personal history of irradiation     Personal history of urinary (tract) infections 11/10/2021    History of urinary tract infection          Past Surgical History:       Past Surgical History:   Procedure Laterality Date    BREAST BIOPSY  11/10/2021    Biopsy Breast Open    BREAST LUMPECTOMY Left     with RAD     SECTION, CLASSIC  11/10/2021     Section    COLONOSCOPY  2021    Complete Colonoscopy    TONSILLECTOMY  2021    Tonsillectomy         Medications:       Prior to Admission medications    Medication Sig Start Date End Date Taking? Authorizing  Provider   atorvastatin (Lipitor) 10 mg tablet Take 1 tablet (10 mg) by mouth once daily. 10/29/24   Raúl Granados MD   cholecalciferol (Vitamin D-3) 25 MCG (1000 UT) capsule Take by mouth.    Historical Provider, MD   EPINEPHrine 0.3 mg/0.3 mL injection syringe Inject 0.3 mL (0.3 mg) into the muscle 1 time if needed. 8/31/23   Historical Provider, MD   estradiol (Estrace) 0.01 % (0.1 mg/gram) vaginal cream Insert 0.25 Applicatorfuls (1 g) into the vagina 2 times a week. 12/4/23   Emma Quiroz MD   L. acidophilus/Bifid. animalis 32 billion cell capsule Take by mouth. 4/1/21   Historical Provider, MD   vitamin E 45 mg (100 unit) capsule Take by mouth. 4/1/21   Historical Provider, MD LOCK  Review of Systems     Blood, Urine   Date Value Ref Range Status   11/08/2024 NEGATIVE NEGATIVE Final     Poc Nitrite, Urine   Date Value Ref Range Status   06/10/2024 NEGATIVE NEGATIVE Final     Nitrite, Urine   Date Value Ref Range Status   11/08/2024 NEGATIVE NEGATIVE Final     Urobilinogen, Urine   Date Value Ref Range Status   11/08/2024 Normal Normal mg/dL Final         PHYSICAL EXAM:      There were no vitals taken for this visit.     No LMP recorded. Patient is postmenopausal.      Declines chaperone for physical exam.      Well developed, well nourished, in no apparent distress.   Neurologic/Psychiatric:  Awake, Alert and Oriented times 3.  Affect normal.     GENITAL/URINARY:       External Genitalia:  The patient has normal appearing external genitalia, normal skenes and bartholins glands, and a normal hair distribution.  Her vulva is without lesions, erythema or discharge.  It is non-tender with appropriate sensation.     Urethral Meatus:  Size normal, Location normal, Lesions absent, Urethral caruncle is tiny at about 2 mm, no significant redness     Urethra:  Fullness absent, Masses absent,      Bladder:  Fullness absent, Masses absent, Tenderness absent,      Vagina:  General appearance normal, Discharge  absent, Lesions absent, Minimal prolapse      Cervix: Normal, no discharge.   Uterus:  small, mobile   Adnexa: bilateral normal adnexa, no significant adnexal masses      Anus/Perineum:  Lesions absent and Masses absent defer exam  Normal Perineum      Data and DIAGNOSTIC STUDIES REVIEWED   CT chest w IV contrast    Result Date: 11/13/2024  Interpreted By:  Schoenberger, Joseph, STUDY: CT CHEST W IV CONTRAST;  11/11/2024 10:12 am   INDICATION: Signs/Symptoms:Abnormal Breast MRI, unable to find area of abnormality seen in right chest wall on diagnostic breast imaging. Next step as recommneded from radiiology is for CT chest contrast.   ,R92.8 Other abnormal and inconclusive findings on diagnostic imaging of breast,C50.912 Malignant neoplasm of unspecified site of left female breast,Z17.0 Estrogen receptor positive status (ER+)   COMPARISON: None.   ACCESSION NUMBER(S): [id number]   ORDERING CLINICIAN: BETITO ANDRADE   TECHNIQUE: Helical data acquisition of the chest was obtained  with the intravenous injection of 75 cc Omnipaque 350. Images were reformatted in axial, coronal, and sagittal planes.   FINDINGS: LUNGS AND AIRWAYS: The large airways appear intact.   There is some minimal subpleural reticulation in the superior segment of the right lower lobe likely postinflammatory scarring. There are localized tree-in-bud opacities in the periphery of the posterior right upper lobe which are likely inflammatory infectious and could indicate the presence of an atypical Armani and bacterial infection of undetermined acuity. Correlate with clinical symptoms. Please reference axial reconstructions numbered 111 through 129 of series 203. No pleural effusion or pneumothorax, or airspace consolidation is evident.   MEDIASTINUM AND LETY, LOWER NECK AND AXILLA: The visualized thyroid gland is within normal limits.   No evidence of thoracic lymphadenopathy by CT criteria.   Esophagus appears within normal limits as seen.    HEART AND VESSELS: The thoracic aorta is normal in course and caliber with normal luminal opacification in no significant atherosclerotic plaque.   Main pulmonary artery and its branches are normal in caliber.   No coronary atherosclerotic calcifications are noted. The study is not optimized for evaluation of coronary arteries.   The cardiac chambers are not enlarged.   No evidence of pericardial effusion.   UPPER ABDOMEN: The gallbladder has a somewhat abnormal appearance with a possible thickened wall enhancing mucosa. It is not completely evaluated on this exam and further evaluation with dedicated right upper quadrant sonography is recommended.   CHEST WALL AND OSSEOUS STRUCTURES: The all there is no CT finding on this exam that would explain the described non mass enhancement in the inferolateral right breast. The chest wall soft tissues appear unremarkable other than some surgical clips associated with the lateral left breast. The no evidence for aggressive osteolytic or osteoblastic lesion is seen.       1.  There is no CT finding that explains the presence of the non mass enhancement described on the recent breast MR I. Recommend continuation with the recommended follow-up given at the time of that exam recommending follow-up breast MRI in 6 months. 2. There is a small geographic area of tree-in-bud opacities in the posterolateral right upper lobe as described above. This is likely inflammatory/infection of age-indeterminate its in could be related to atypical mycobacterial infection. Correlate clinically. 3. The gallbladder has a somewhat abnormal appearance on the limits of the views. Recommend follow-up  right upper quadrant sonogram.   MACRO: Critical Finding:  See findings. Notification was initiated on 11/13/2024 at 10:11 am by  Joseph Schoenberger.  (**-YCF-**) Instructions:  US Abdomen Limited. out-patient.   Signed by: Joseph Schoenberger 11/13/2024 10:12 AM Dictation workstation:    [id number]    BI mammo right diagnostic tomosynthesis    Result Date: 11/6/2024  Interpreted By:  Marshall Sepulveda, STUDY: BI MAMMO RIGHT DIAGNOSTIC TOMOSYNTHESIS; BI US BREAST LIMITED RIGHT; 11/6/2024 2:03 pm; 11/6/2024 2:25 pm   ACCESSION NUMBER(S): [id number]; [id number]   ORDERING CLINICIAN: BETITO ANDRADE   INDICATION: MRI-directed evaluation for focal patchy non-mass enhancement in inferolateral right breast with possible underlying chest wall involvement seen on recent MRI. History of left breast cancer treated with lumpectomy.   ,C50.912 Malignant neoplasm of unspecified site of left female breast,Z17.0 Estrogen receptor positive status (ER+),R92.8 Other abnormal and inconclusive findings on diagnostic imaging of breast   COMPARISON: Breast MRI 10/28/2024   FINDINGS: MAMMOGRAPHY: 2D and tomosynthesis images were reviewed at 1 mm slice thickness.   Density:  The breasts are heterogeneously dense, which may obscure small masses.   No suspicious masses or calcifications are identified to correspond with the questioned enhancement seen on MRI.   ULTRASOUND: Targeted ultrasound with elastography was performed by a registered sonographer in the entire lateral right breast. No focal sonographic abnormalities are identified to correspond with the questioned enhancement seen on MRI. A benign simple cyst or nonenlarged intramammary lymph node was incidentally noted at 10 o'clock, 9 cm from the nipple.       No mammographic or sonographic abnormality is identified to correspond with the questioned enhancement in the right breast/chest wall seen on recent breast MRI. Chest CT with contrast is recommended for further evaluation. If no abnormality is identified on chest CT, the enhancement could be considered artifactual or secondary to infectious/inflammatory process and be followed with breast MRI in 6 months.   Dr. Marshall Sepulveda discussed the findings and recommendations with the patient and her  at  the time of exam.   BI-RADS CATEGORY: BI-RADS Category:  2 Benign. Recommendation:  Clinical follow-up. Recommended Date:  Immediate. Laterality:  Right.   For any future breast imaging appointments, please call [phone] ([phone]).     MACRO: None   Signed by: Marshall Sepulveda 11/6/2024 5:47 PM Dictation workstation:   [id number]    BI US breast limited right    Result Date: 11/6/2024  Interpreted By:  Marshall Sepulveda, STUDY: BI MAMMO RIGHT DIAGNOSTIC TOMOSYNTHESIS; BI US BREAST LIMITED RIGHT; 11/6/2024 2:03 pm; 11/6/2024 2:25 pm   ACCESSION NUMBER(S): [id number]; [id number]   ORDERING CLINICIAN: BETITO ANDRADE   INDICATION: MRI-directed evaluation for focal patchy non-mass enhancement in inferolateral right breast with possible underlying chest wall involvement seen on recent MRI. History of left breast cancer treated with lumpectomy.   ,C50.912 Malignant neoplasm of unspecified site of left female breast,Z17.0 Estrogen receptor positive status (ER+),R92.8 Other abnormal and inconclusive findings on diagnostic imaging of breast   COMPARISON: Breast MRI 10/28/2024   FINDINGS: MAMMOGRAPHY: 2D and tomosynthesis images were reviewed at 1 mm slice thickness.   Density:  The breasts are heterogeneously dense, which may obscure small masses.   No suspicious masses or calcifications are identified to correspond with the questioned enhancement seen on MRI.   ULTRASOUND: Targeted ultrasound with elastography was performed by a registered sonographer in the entire lateral right breast. No focal sonographic abnormalities are identified to correspond with the questioned enhancement seen on MRI. A benign simple cyst or nonenlarged intramammary lymph node was incidentally noted at 10 o'clock, 9 cm from the nipple.       No mammographic or sonographic abnormality is identified to correspond with the questioned enhancement in the right breast/chest wall seen on recent breast MRI. Chest CT with contrast is recommended for  further evaluation. If no abnormality is identified on chest CT, the enhancement could be considered artifactual or secondary to infectious/inflammatory process and be followed with breast MRI in 6 months.   Dr. Marshall Sepulveda discussed the findings and recommendations with the patient and her  at the time of exam.   BI-RADS CATEGORY: BI-RADS Category:  2 Benign. Recommendation:  Clinical follow-up. Recommended Date:  Immediate. Laterality:  Right.   For any future breast imaging appointments, please call [phone] ([phone]).     MACRO: None   Signed by: Marshall Sepulveda 11/6/2024 5:47 PM Dictation workstation:   [id number]     Lab Results   Component Value Date    URINECULTURE >100,000 Escherichia coli (A) 06/12/2024      Lab Results   Component Value Date    GLUCOSE 92 11/08/2024    CALCIUM 9.8 11/08/2024     11/08/2024    K 4.1 11/08/2024    CO2 32 11/08/2024     11/08/2024    BUN 19 11/08/2024    CREATININE 0.77 11/08/2024     Lab Results   Component Value Date    WBC 6.7 11/08/2024    HGB 14.7 11/08/2024    HCT 45.2 11/08/2024    MCV 97 11/08/2024     11/08/2024          Emma Quiroz MD

## 2024-12-05 ENCOUNTER — OFFICE VISIT (OUTPATIENT)
Dept: OBSTETRICS AND GYNECOLOGY | Facility: CLINIC | Age: 74
End: 2024-12-05
Payer: MEDICARE

## 2024-12-05 ENCOUNTER — APPOINTMENT (OUTPATIENT)
Dept: OBSTETRICS AND GYNECOLOGY | Facility: CLINIC | Age: 74
End: 2024-12-05
Payer: MEDICARE

## 2024-12-05 VITALS
WEIGHT: 122.6 LBS | DIASTOLIC BLOOD PRESSURE: 51 MMHG | BODY MASS INDEX: 19.7 KG/M2 | HEIGHT: 66 IN | HEART RATE: 65 BPM | SYSTOLIC BLOOD PRESSURE: 99 MMHG

## 2024-12-05 DIAGNOSIS — N39.0 RECURRENT UTI (URINARY TRACT INFECTION): Primary | ICD-10-CM

## 2024-12-05 DIAGNOSIS — N39.0 ACUTE LOWER UTI: ICD-10-CM

## 2024-12-05 DIAGNOSIS — N95.2 VAGINAL ATROPHY: ICD-10-CM

## 2024-12-05 PROCEDURE — 1159F MED LIST DOCD IN RCRD: CPT | Performed by: OBSTETRICS & GYNECOLOGY

## 2024-12-05 PROCEDURE — 1157F ADVNC CARE PLAN IN RCRD: CPT | Performed by: OBSTETRICS & GYNECOLOGY

## 2024-12-05 PROCEDURE — 1036F TOBACCO NON-USER: CPT | Performed by: OBSTETRICS & GYNECOLOGY

## 2024-12-05 PROCEDURE — 99213 OFFICE O/P EST LOW 20 MIN: CPT | Performed by: OBSTETRICS & GYNECOLOGY

## 2024-12-05 PROCEDURE — 1126F AMNT PAIN NOTED NONE PRSNT: CPT | Performed by: OBSTETRICS & GYNECOLOGY

## 2024-12-05 RX ORDER — SULFAMETHOXAZOLE AND TRIMETHOPRIM 800; 160 MG/1; MG/1
1 TABLET ORAL 2 TIMES DAILY
Qty: 10 TABLET | Refills: 0 | Status: SHIPPED | OUTPATIENT
Start: 2024-12-05 | End: 2024-12-10

## 2024-12-05 RX ORDER — ESTRADIOL 0.1 MG/G
1 CREAM VAGINAL 2 TIMES WEEKLY
Qty: 42.5 G | Refills: 3 | Status: SHIPPED | OUTPATIENT
Start: 2024-12-05

## 2024-12-05 RX ORDER — CEPHALEXIN 500 MG/1
CAPSULE ORAL
Qty: 60 CAPSULE | Refills: 3 | Status: SHIPPED | OUTPATIENT
Start: 2024-12-05

## 2024-12-05 ASSESSMENT — PAIN SCALES - GENERAL: PAINLEVEL_OUTOF10: 0-NO PAIN

## 2024-12-09 ENCOUNTER — APPOINTMENT (OUTPATIENT)
Dept: PRIMARY CARE | Facility: CLINIC | Age: 74
End: 2024-12-09
Payer: MEDICARE

## 2024-12-09 VITALS
OXYGEN SATURATION: 99 % | BODY MASS INDEX: 20.33 KG/M2 | WEIGHT: 122 LBS | SYSTOLIC BLOOD PRESSURE: 122 MMHG | HEART RATE: 62 BPM | HEIGHT: 65 IN | DIASTOLIC BLOOD PRESSURE: 68 MMHG

## 2024-12-09 DIAGNOSIS — A49.9 BACTERIAL UTI: ICD-10-CM

## 2024-12-09 DIAGNOSIS — N39.0 BACTERIAL UTI: ICD-10-CM

## 2024-12-09 DIAGNOSIS — H90.3 SENSORINEURAL HEARING LOSS, BILATERAL: ICD-10-CM

## 2024-12-09 DIAGNOSIS — C50.912 MALIGNANT NEOPLASM OF LEFT BREAST IN FEMALE, ESTROGEN RECEPTOR POSITIVE, UNSPECIFIED SITE OF BREAST: ICD-10-CM

## 2024-12-09 DIAGNOSIS — E78.2 MIXED HYPERLIPIDEMIA: ICD-10-CM

## 2024-12-09 DIAGNOSIS — R93.89 ABNORMAL CT OF THE CHEST: Primary | ICD-10-CM

## 2024-12-09 DIAGNOSIS — Z17.0 MALIGNANT NEOPLASM OF LEFT BREAST IN FEMALE, ESTROGEN RECEPTOR POSITIVE, UNSPECIFIED SITE OF BREAST: ICD-10-CM

## 2024-12-09 PROCEDURE — 1157F ADVNC CARE PLAN IN RCRD: CPT | Performed by: INTERNAL MEDICINE

## 2024-12-09 PROCEDURE — UHSPHYS PR UH SELECT PHYSICAL: Performed by: INTERNAL MEDICINE

## 2024-12-09 ASSESSMENT — ENCOUNTER SYMPTOMS
CHEST TIGHTNESS: 0
UNEXPECTED WEIGHT CHANGE: 0
ABDOMINAL PAIN: 0
JOINT SWELLING: 0
ACTIVITY CHANGE: 0
WEAKNESS: 0
BACK PAIN: 0
NAUSEA: 0
HYPERACTIVE: 0
MYALGIAS: 0
SHORTNESS OF BREATH: 0
VOMITING: 0
TROUBLE SWALLOWING: 0
ADENOPATHY: 0
COUGH: 0
POLYDIPSIA: 0
SINUS PRESSURE: 0
DYSPHORIC MOOD: 0
CONSTITUTIONAL NEGATIVE: 1
BLOOD IN STOOL: 0
SLEEP DISTURBANCE: 0
HEADACHES: 0
DYSURIA: 0
NERVOUS/ANXIOUS: 0
CONFUSION: 0
ARTHRALGIAS: 0
BRUISES/BLEEDS EASILY: 0
HEMATURIA: 0
CONSTIPATION: 0
DIZZINESS: 0
WHEEZING: 0
RHINORRHEA: 0
SORE THROAT: 0
FATIGUE: 0
CHILLS: 0
SINUS PAIN: 0
ABDOMINAL DISTENTION: 0
NECK STIFFNESS: 0
NUMBNESS: 0
PALPITATIONS: 0
LIGHT-HEADEDNESS: 0
FEVER: 0
FREQUENCY: 0
DIARRHEA: 0
FACIAL SWELLING: 0

## 2024-12-09 NOTE — ASSESSMENT & PLAN NOTE
The patient CT of the chest done for her breast follow-up did show some tree-in-bud issues in the lower lower lung fields.  Patient is asymptomatic so no further workup will be done there.  She also had some nonspecific thickening of her gallbladder wall we will get an ultrasound of the gallbladder to follow that up.

## 2024-12-09 NOTE — ASSESSMENT & PLAN NOTE
Patient continues to follow-up with the breast surgery team.  She is now off of it estrogen blocking agents which were very problematic for her.  She is feeling dramatically better since being off of them.  Will have her continue to stay off of anastrozole and will continue with every 6-month follow-up with them.  The most recent MRI ultrasound and CT scan were unremarkable for any problems.

## 2024-12-09 NOTE — PROGRESS NOTES
Paulina Knutson       Patient is here for a complete physical and a general checkup.  She feels generally quite well without any major complaints.  She discontinued anastrozole which was causing her variety of symptoms including dizziness weakness difficulty walking and loss of coordination.  She discontinued the anastrozole approximately 3 months ago and her symptoms have all completely resolved.  She just recently followed up with her breast team she had an abnormality seen on MRI but this was further evaluated with ultrasound mammogram and CT scan all of which were unremarkable.  In terms of her breast.  There was a suggestion on the CT of a thickening of her gallbladder wall.  We will get an ultrasound to further evaluate that.           Review of Systems   Constitutional: Negative.  Negative for activity change, chills, fatigue, fever and unexpected weight change.   HENT:  Negative for dental problem, ear pain, facial swelling, hearing loss, mouth sores, rhinorrhea, sinus pressure, sinus pain, sore throat, tinnitus and trouble swallowing.    Eyes:  Negative for visual disturbance.   Respiratory:  Negative for cough, chest tightness, shortness of breath and wheezing.    Cardiovascular:  Negative for chest pain, palpitations and leg swelling.   Gastrointestinal:  Negative for abdominal distention, abdominal pain, blood in stool, constipation, diarrhea, nausea and vomiting.   Endocrine: Negative for cold intolerance, heat intolerance, polydipsia and polyuria.   Genitourinary:  Negative for dysuria, frequency, hematuria and urgency.   Musculoskeletal:  Negative for arthralgias, back pain, joint swelling, myalgias and neck stiffness.   Skin:  Negative for rash.   Allergic/Immunologic: Negative for food allergies.   Neurological:  Negative for dizziness, weakness, light-headedness, numbness and headaches.   Hematological:  Negative for adenopathy. Does not bruise/bleed easily.   Psychiatric/Behavioral:  Negative for  "confusion, dysphoric mood and sleep disturbance. The patient is not nervous/anxious and is not hyperactive.           Objective      Visit Vitals  /68   Pulse 62   Ht 1.66 m (5' 5.35\")   Wt 55.3 kg (122 lb)   SpO2 99%   BMI 20.08 kg/m²   OB Status Postmenopausal   Smoking Status Never   BSA 1.6 m²        Physical Exam  Constitutional:       Appearance: Normal appearance. She is normal weight.   HENT:      Head: Normocephalic and atraumatic.      Right Ear: Tympanic membrane, ear canal and external ear normal.      Left Ear: Tympanic membrane, ear canal and external ear normal.      Nose: Nose normal.      Mouth/Throat:      Mouth: Mucous membranes are moist. No oral lesions.      Pharynx: Oropharynx is clear. Uvula midline. No oropharyngeal exudate or posterior oropharyngeal erythema.   Neck:      Thyroid: No thyroid mass or thyromegaly.      Vascular: No carotid bruit or JVD.   Cardiovascular:      Rate and Rhythm: Normal rate and regular rhythm.      Pulses: Normal pulses.           Carotid pulses are 2+ on the right side and 2+ on the left side.       Radial pulses are 2+ on the right side and 2+ on the left side.        Femoral pulses are 2+ on the right side and 2+ on the left side.       Popliteal pulses are 2+ on the right side and 2+ on the left side.        Dorsalis pedis pulses are 2+ on the right side and 2+ on the left side.        Posterior tibial pulses are 2+ on the right side and 2+ on the left side.      Heart sounds: Normal heart sounds, S1 normal and S2 normal. No murmur heard.  Pulmonary:      Effort: Pulmonary effort is normal.      Breath sounds: Normal breath sounds.   Abdominal:      General: Abdomen is flat. Bowel sounds are normal.      Palpations: Abdomen is soft.      Tenderness: There is no abdominal tenderness.      Hernia: No hernia is present.   Musculoskeletal:         General: No swelling or tenderness. Normal range of motion.      Cervical back: Normal range of motion and " neck supple. No rigidity.      Right lower leg: No edema.      Left lower leg: No edema.   Lymphadenopathy:      Cervical: No cervical adenopathy.   Skin:     General: Skin is warm and dry.      Findings: No lesion or rash.   Neurological:      General: No focal deficit present.      Mental Status: She is alert and oriented to person, place, and time. Mental status is at baseline.   Psychiatric:         Mood and Affect: Mood normal.         Thought Content: Thought content normal.              Assess/Plan SmartLinks:   Problem List Items Addressed This Visit             ICD-10-CM    Sensorineural hearing loss, bilateral H90.3     Patient continues to use a single hearing aid with binaural pickups.         Bacterial UTI N39.0, A49.9     Patient is now on suppressive antibiotics with Keflex daily.  She seems to have a lot of issues when she swims so hopefully this will help prevent recurrent infections.         Breast cancer, left (Multi) C50.912     Patient continues to follow-up with the breast surgery team.  She is now off of it estrogen blocking agents which were very problematic for her.  She is feeling dramatically better since being off of them.  Will have her continue to stay off of anastrozole and will continue with every 6-month follow-up with them.  The most recent MRI ultrasound and CT scan were unremarkable for any problems.         Hyperlipidemia E78.5     With her under good control with current dose of atorvastatin.  Will continue that prescription.         Abnormal CT of the chest - Primary R93.89     The patient CT of the chest done for her breast follow-up did show some tree-in-bud issues in the lower lower lung fields.  Patient is asymptomatic so no further workup will be done there.  She also had some nonspecific thickening of her gallbladder wall we will get an ultrasound of the gallbladder to follow that up.         Relevant Orders    US gallbladder   Patient is up-to-date on all  vaccines  Patient is up-to-date on colonoscopy

## 2024-12-09 NOTE — ASSESSMENT & PLAN NOTE
Patient is now on suppressive antibiotics with Keflex daily.  She seems to have a lot of issues when she swims so hopefully this will help prevent recurrent infections.

## 2024-12-13 ENCOUNTER — HOSPITAL ENCOUNTER (OUTPATIENT)
Dept: RADIOLOGY | Facility: CLINIC | Age: 74
Discharge: HOME | End: 2024-12-13
Payer: MEDICARE

## 2024-12-13 DIAGNOSIS — R93.89 ABNORMAL CT OF THE CHEST: ICD-10-CM

## 2024-12-13 PROCEDURE — 76705 ECHO EXAM OF ABDOMEN: CPT

## 2025-01-17 ENCOUNTER — HOSPITAL ENCOUNTER (OUTPATIENT)
Dept: RADIATION ONCOLOGY | Facility: CLINIC | Age: 75
Setting detail: RADIATION/ONCOLOGY SERIES
Discharge: HOME | End: 2025-01-17
Payer: MEDICARE

## 2025-01-17 VITALS
HEART RATE: 65 BPM | DIASTOLIC BLOOD PRESSURE: 76 MMHG | SYSTOLIC BLOOD PRESSURE: 166 MMHG | RESPIRATION RATE: 18 BRPM | WEIGHT: 120.5 LBS | OXYGEN SATURATION: 95 % | TEMPERATURE: 97 F | BODY MASS INDEX: 19.84 KG/M2

## 2025-01-17 DIAGNOSIS — C50.912 MALIGNANT NEOPLASM OF LEFT BREAST IN FEMALE, ESTROGEN RECEPTOR POSITIVE, UNSPECIFIED SITE OF BREAST: Primary | ICD-10-CM

## 2025-01-17 DIAGNOSIS — Z17.0 MALIGNANT NEOPLASM OF LEFT BREAST IN FEMALE, ESTROGEN RECEPTOR POSITIVE, UNSPECIFIED SITE OF BREAST: Primary | ICD-10-CM

## 2025-01-17 DIAGNOSIS — Z90.12 STATUS POST PARTIAL MASTECTOMY OF LEFT BREAST: ICD-10-CM

## 2025-01-17 PROCEDURE — 99213 OFFICE O/P EST LOW 20 MIN: CPT | Performed by: NURSE PRACTITIONER

## 2025-01-17 ASSESSMENT — PAIN SCALES - GENERAL: PAINLEVEL_OUTOF10: 0-NO PAIN

## 2025-01-17 NOTE — PROGRESS NOTES
Radiation Oncology Follow-Up    Patient Name:  Paulina Knutson  MRN:  [id number]  :  1950    Referring Provider: No ref. provider found  Primary Care Provider: Raúl Granados MD  Care Team: Patient Care Team:  Raúl Granados MD as PCP - General (Internal Medicine)    Date of Service: 2025        Cancer Staging:          Breast         AJCC Edition: 8th (AJCC), Diagnosis Date: May 2021, IA, pT1c pN0 cM0 G1     Treatment Synopsis:    73 yo female with  IA , pT1c pN0 cM0 G1 left breast cancer s/p lumpectomy with SLNBx followed by adjuvant radiation.      Treatment Summary:      -3/24/21: Bilateral screening mammogram revealed a focal asymmetry in the left breast.  -3/25/21: Dx MG/US confirmed a 0.5 x 0.6 x 0.4 cm irregular hypoechoic mass at 3:00, 7 cm FN. A small irregularly hypoechoic mass was seen at 2:00, 7 cm FN, possibly congruent with other lesion. Left axilla appeared unremarkable.  -21: US-guided CNB at 2:00 revealed IDC, grade 1-2, with associated microcalcifications; ER >95%, VT 65%, Her2-negative (1+ IHC). Mammaprint assay low-risk (+0.754), luminal A type.  -21: Left lumpectomy/SLNBx showed 1.8 cm of grade 1 IDC, no LVI, 0/2 SLN’s involved. Margins negative.  -21 - 21: treated with partial breast irradiation consisting of a total dose of 30 Gy given in 5 fractions of 6 Gy.   - initiated on anastrozole post treatment.      SUBJECTIVE  History of Present Illness:   Paulina Knutson is here today for routine radiation follow up/surveillance visit.  She has been well and has had minimal skin effects from radiation. Skin reported as intact  and no issues. Denies any breast pain or palpable findings.  No swelling of left arm or difficulty with ROM.  She is no longer taking anastrozole due to intolerable side effects (nausea, dizziness, balance issues) which have resolved. She continues taking daily calcium and Vit D supps. She is walking 2-3 miles daily and works with a personal  . Denies headaches, fever, chills, cough, SOB, chest pain, GI or  complaints or bony  pain.  Had mammogram, breast MRI, CT of chest and no evidence of malignancy.    Review of Systems:    Review of Systems   All other systems reviewed and are negative.    Performance Status:   The Karnofsky performance scale today is 100, Fully active, able to carry on all pre-disease performed without restriction (ECOG equivalent 0).      OBJECTIVE    Current Outpatient Medications:     atorvastatin (Lipitor) 10 mg tablet, Take 1 tablet (10 mg) by mouth once daily., Disp: 90 tablet, Rfl: 3    cephalexin (Keflex) 500 mg capsule, 1 capsule daily any day that you swim, Disp: 60 capsule, Rfl: 3    cholecalciferol (Vitamin D-3) 25 MCG (1000 UT) capsule, Take by mouth., Disp: , Rfl:     EPINEPHrine 0.3 mg/0.3 mL injection syringe, Inject 0.3 mL (0.3 mg) into the muscle 1 time if needed., Disp: , Rfl:     estradiol (Estrace) 0.01 % (0.1 mg/gram) vaginal cream, Insert 0.25 Applicatorfuls (1 g) into the vagina 2 times a week., Disp: 42.5 g, Rfl: 3    L. acidophilus/Bifid. animalis 32 billion cell capsule, Take by mouth., Disp: , Rfl:     vitamin E 45 mg (100 unit) capsule, Take by mouth., Disp: , Rfl:      Physical Exam  Constitutional:       Appearance: Normal appearance.   HENT:      Head: Normocephalic and atraumatic.      Nose: Nose normal.      Mouth/Throat:      Mouth: Mucous membranes are moist.      Pharynx: Oropharynx is clear.   Eyes:      Conjunctiva/sclera: Conjunctivae normal.      Pupils: Pupils are equal, round, and reactive to light.   Cardiovascular:      Rate and Rhythm: Normal rate and regular rhythm.      Heart sounds: Normal heart sounds.   Pulmonary:      Effort: Pulmonary effort is normal.      Breath sounds: Normal breath sounds.   Chest:   Breasts:     Right: Normal. No swelling, inverted nipple, mass, nipple discharge or skin change.      Left: Normal. No swelling, inverted nipple, mass, nipple discharge  or skin change.   Abdominal:      General: Abdomen is flat.      Palpations: Abdomen is soft.   Musculoskeletal:         General: No swelling. Normal range of motion.      Cervical back: Normal range of motion and neck supple.   Lymphadenopathy:      Cervical: No cervical adenopathy.      Upper Body:      Right upper body: No supraclavicular or axillary adenopathy.      Left upper body: No supraclavicular or axillary adenopathy.   Skin:     General: Skin is warm and dry.   Neurological:      General: No focal deficit present.      Mental Status: She is alert and oriented to person, place, and time.   Psychiatric:         Mood and Affect: Mood normal.         Behavior: Behavior normal.         RESULTS:  Narrative & Impression   Interpreted By:  Marshall Sepulveda,   STUDY:  BI MAMMO RIGHT DIAGNOSTIC TOMOSYNTHESIS; BI US BREAST LIMITED RIGHT;  11/6/2024 2:03 pm; 11/6/2024 2:25 pm      ACCESSION NUMBER(S):  [id number]; [id number]      ORDERING CLINICIAN:  BETITO ANDRADE      INDICATION:  MRI-directed evaluation for focal patchy non-mass enhancement in  inferolateral right breast with possible underlying chest wall  involvement seen on recent MRI. History of left breast cancer treated  with lumpectomy.      ,C50.912 Malignant neoplasm of unspecified site of left female  breast,Z17.0 Estrogen receptor positive status (ER+),R92.8 Other  abnormal and inconclusive findings on diagnostic imaging of breast      COMPARISON:  Breast MRI 10/28/2024      FINDINGS:  MAMMOGRAPHY: 2D and tomosynthesis images were reviewed at 1 mm slice  thickness.      Density:  The breasts are heterogeneously dense, which may obscure  small masses.      No suspicious masses or calcifications are identified to correspond  with the questioned enhancement seen on MRI.      ULTRASOUND: Targeted ultrasound with elastography was performed by a  registered sonographer in the entire lateral right breast. No focal  sonographic abnormalities are  identified to correspond with the  questioned enhancement seen on MRI. A benign simple cyst or  nonenlarged intramammary lymph node was incidentally noted at 10  o'clock, 9 cm from the nipple.      IMPRESSION:  No mammographic or sonographic abnormality is identified to  correspond with the questioned enhancement in the right breast/chest  wall seen on recent breast MRI. Chest CT with contrast is recommended  for further evaluation. If no abnormality is identified on chest CT,  the enhancement could be considered artifactual or secondary to  infectious/inflammatory process and be followed with breast MRI in 6  months.      Dr. Marshall Sepulveda discussed the findings and recommendations with  the patient and her  at the time of exam.      BI-RADS CATEGORY:  BI-RADS Category:  2 Benign.  Recommendation:  Clinical follow-up.  Recommended Date:  Immediate.  Laterality:  Right.     Narrative & Impression   Interpreted By:  Schoenberger, Joseph,   STUDY:  CT CHEST W IV CONTRAST;  11/11/2024 10:12 am      INDICATION:  Signs/Symptoms:Abnormal Breast MRI, unable to find area of  abnormality seen in right chest wall on diagnostic breast imaging.  Next step as recommneded from radiiology is for CT chest contrast.      ,R92.8 Other abnormal and inconclusive findings on diagnostic imaging  of breast,C50.912 Malignant neoplasm of unspecified site of left  female breast,Z17.0 Estrogen receptor positive status (ER+)      COMPARISON:  None.      ACCESSION NUMBER(S):  [id number]      ORDERING CLINICIAN:  BETITO ANDRADE      TECHNIQUE:  Helical data acquisition of the chest was obtained  with the  intravenous injection of 75 cc Omnipaque 350. Images were reformatted  in axial, coronal, and sagittal planes.      FINDINGS:  LUNGS AND AIRWAYS:  The large airways appear intact.      There is some minimal subpleural reticulation in the superior segment  of the right lower lobe likely postinflammatory scarring. There  are  localized tree-in-bud opacities in the periphery of the posterior  right upper lobe which are likely inflammatory infectious and could  indicate the presence of an atypical Armani and bacterial infection  of undetermined acuity. Correlate with clinical symptoms. Please  reference axial reconstructions numbered 111 through 129 of series  203. No pleural effusion or pneumothorax, or airspace consolidation  is evident.      MEDIASTINUM AND LETY, LOWER NECK AND AXILLA:  The visualized thyroid gland is within normal limits.      No evidence of thoracic lymphadenopathy by CT criteria.      Esophagus appears within normal limits as seen.      HEART AND VESSELS:  The thoracic aorta is normal in course and caliber with normal  luminal opacification in no significant atherosclerotic plaque.      Main pulmonary artery and its branches are normal in caliber.      No coronary atherosclerotic calcifications are noted. The study is  not optimized for evaluation of coronary arteries.      The cardiac chambers are not enlarged.      No evidence of pericardial effusion.      UPPER ABDOMEN:  The gallbladder has a somewhat abnormal appearance with a possible  thickened wall enhancing mucosa. It is not completely evaluated on  this exam and further evaluation with dedicated right upper quadrant  sonography is recommended.      CHEST WALL AND OSSEOUS STRUCTURES:  The all there is no CT finding on this exam that would explain the  described non mass enhancement in the inferolateral right breast. The  chest wall soft tissues appear unremarkable other than some surgical  clips associated with the lateral left breast. The no evidence for  aggressive osteolytic or osteoblastic lesion is seen.      IMPRESSION:  1.  There is no CT finding that explains the presence of the non mass  enhancement described on the recent breast MR I. Recommend  continuation with the recommended follow-up given at the time of that  exam recommending follow-up  breast MRI in 6 months.  2. There is a small geographic area of tree-in-bud opacities in the  posterolateral right upper lobe as described above. This is likely  inflammatory/infection of age-indeterminate its in could be related  to atypical mycobacterial infection. Correlate clinically.  3. The gallbladder has a somewhat abnormal appearance on the limits  of the views. Recommend follow-up  right upper quadrant sonogram.      MACRO:  Critical Finding:  See findings. Notification was initiated on  11/13/2024 at 10:11 am by  Joseph Schoenberger.  (**-YCF-**)  Instructions:  US Abdomen Limited. out-patient.         Narrative & Impression   Interpreted By:  Zak Fuentes,   STUDY:  US GALLBLADDER;  12/13/2024 2:38 pm      INDICATION:  Signs/Symptoms:Abn CT finding.      COMPARISON:  CT dated 11/11/2024.      ACCESSION NUMBER(S):  [id number]      ORDERING CLINICIAN:  ROB BEACH      TECHNIQUE:  Multiple images of the right upper quadrant were obtained.  Gray  scale, color Doppler and spectral Doppler waveform analysis was  performed.      FINDINGS:  LIVER:  14.6 cm in length. No focal abnormality. Normal hepatic echogenicity.      GALLBLADDER:  No gallstones. No gallbladder wall thickening. Negative sonographic  Mathis's sign.      BILIARY SYSTEM:  Nondilated.      PANCREAS:  Visualized portion of the body unremarkable. Remainder obscured by  overlying bowel gas.      RIGHT KIDNEY:  8.8 cm in length. No hydronephrosis. No focal renal abnormality.      IMPRESSION:  Negative right upper quadrant ultrasound.     ASSESSMENT/PLAN:  74 y.o. female with early stage low risk left breast cancer s/p breast conserving surgery followed by partial breast radiation.  She will have mammogram and follow  up with Heike Madrid CNP in April.  Follow up with Cara Saldivar CNP in August. Radiation follow up in 12 mo.  Call with any concerns.     Juanis Sorensen CNP  [phone]

## 2025-01-22 ENCOUNTER — APPOINTMENT (OUTPATIENT)
Dept: AUDIOLOGY | Facility: CLINIC | Age: 75
End: 2025-01-22
Payer: MEDICARE

## 2025-02-19 ENCOUNTER — APPOINTMENT (OUTPATIENT)
Dept: NEUROLOGY | Facility: HOSPITAL | Age: 75
End: 2025-02-19
Payer: MEDICARE

## 2025-03-06 ENCOUNTER — OFFICE VISIT (OUTPATIENT)
Dept: PRIMARY CARE | Facility: CLINIC | Age: 75
End: 2025-03-06
Payer: MEDICARE

## 2025-03-06 VITALS — HEART RATE: 69 BPM | OXYGEN SATURATION: 98 % | DIASTOLIC BLOOD PRESSURE: 72 MMHG | SYSTOLIC BLOOD PRESSURE: 130 MMHG

## 2025-03-06 DIAGNOSIS — G50.0 TRIGEMINAL NEURALGIA: Primary | ICD-10-CM

## 2025-03-06 PROCEDURE — 1157F ADVNC CARE PLAN IN RCRD: CPT | Performed by: INTERNAL MEDICINE

## 2025-03-06 PROCEDURE — 99214 OFFICE O/P EST MOD 30 MIN: CPT | Performed by: INTERNAL MEDICINE

## 2025-03-06 RX ORDER — CARBAMAZEPINE 200 MG/1
200 TABLET ORAL 2 TIMES DAILY
Qty: 60 TABLET | Refills: 5 | Status: SHIPPED | OUTPATIENT
Start: 2025-03-06 | End: 2025-09-02

## 2025-03-06 NOTE — PROGRESS NOTES
Patient is here complaining of a 5-year history of right sided jaw pain which is consistent with trigeminal neuralgia.  She had symptoms initially beginning about 5 years ago which were triggered by eating especially hot and cold things.  Initially her symptoms were very rare and she declined treatment at that time.  Symptoms become progressively worse over the last several years and now are quite severe to the point where she has difficulty eating or opening her mouth when they occur.  They are fairly typically described as sharp stabbing electric shock type pain radiates into her right jaw.  Pain is described as excruciating in nature last for several minutes and then resolved.  She sought  from dentist who did full dental workup which was unremarkable she spoke to a neurology friend of hers who recommended she see a 6 specialist clinic but the first openings are not till the end of May.    Her symptoms are fairly typical she did have an MRI and MRA in May 2024 which was unremarkable.    Most likely trigeminal neuralgia we will start her on carbamazepine 200 mg at bedtime and escalate the dose up to a max of 1200 if she tolerates it or until her symptoms resolved.  She will continue her other medications and will keep her appointments with neurology clinic as scheduled.

## 2025-03-10 ENCOUNTER — HOSPITAL ENCOUNTER (OUTPATIENT)
Dept: RADIOLOGY | Facility: CLINIC | Age: 75
Discharge: HOME | End: 2025-03-10
Payer: MEDICARE

## 2025-03-10 DIAGNOSIS — R42 VERTIGO: ICD-10-CM

## 2025-03-10 PROCEDURE — 70551 MRI BRAIN STEM W/O DYE: CPT

## 2025-03-10 PROCEDURE — 70551 MRI BRAIN STEM W/O DYE: CPT | Performed by: RADIOLOGY

## 2025-03-31 NOTE — PROGRESS NOTES
HEARING AID CHECK    Name:  Paulina Knutson  :  1950  Age:  74 y.o.    HEARING AID INFORMATION:    Right Hearing Aid  Oticon CROS PX miniRITE-R  SN: [id number]  Warranty: 2/10/2025  Left Hearing Aid  Oticon More 1 miniRITE-R  SN: [id number]  Warranty: 2/10/2025    SN: [id number]  ConnectClip  SN: [id number]  Warranty: 2024   Length   Right: 1(60)  Left: 1(85)  Dome/Earmold  Right: 6 mm open bass  Left: 6 mm open bass  Wax Filter  ProWax miniFIT  Battery Size  Rechargeable  TLC Expiration:  2/10/2026      HISTORY:    Patient was seen for check of the above devices. She reported that she has been having trouble with the hearing aids and the chapis, as she reported there is no improvement when wearing the hearing aids versus not. She also requested to review how to use her ConnectClip.     EXAM/PROCEDURES:    Visual inspection revealed significant cerumen accumulation. Initial listening check revealed weak sound quality of the device. Cleaned and checked hearing aids.  Vacuumed davi ports and  ports. Completed Redux dehumidification treatment for patient's hearing aids where <0.5 uL of moisture was removed during a 2:55 minute cycle. Replaced wax filters, domes, and retention locks. Final listening check indicated improved sound quality and function with no distortion of internal feedback. Patient reported improvement in sound quality following clean and check.     Connected devices to fitting software. Completed firmware update and confirmed that no firmware update was needed for the ConnectClip. Replaced rechargeable batteries under warranty.     Reviewed Oticon chapis functions and demonstrated how to use ConnectClip.     RECOMMENDATIONS AND FOLLOW-UP:    - Continue use of amplification and follow-up as recommended for hearing aid maintenance and adjustments.  - Recommended 6-12 month follow-up HAC. Patient to contact the office sooner if problems arise.  - Monitor and recheck  hearing as warranted.  - Counseled regarding results and recommendations.      JANNET Zimmerman., B.S.  Audiology Student Extern    Marah Palmer  Clinical Audiologist

## 2025-04-01 ENCOUNTER — APPOINTMENT (OUTPATIENT)
Dept: AUDIOLOGY | Facility: CLINIC | Age: 75
End: 2025-04-01
Payer: MEDICARE

## 2025-04-01 DIAGNOSIS — H90.A21 SENSORINEURAL HEARING LOSS (SNHL) OF RIGHT EAR WITH RESTRICTED HEARING OF LEFT EAR: Primary | ICD-10-CM

## 2025-04-01 NOTE — PROGRESS NOTES
"Paulina Knutson female   1950 74 y.o.   [id number]      Chief Complaint  Annual mammogram and exam, history of left breast cancer    History Of Present Illness  Paulina (\"Mary\") Eamon is a very pleasant 74 year old Ashkenazi Zoroastrianism female who is status post left breast magseed localized partial mastectomy, left axillary sentinel lymph node biopsy on 2021 for a screen detected left breast invasive ductal carcinoma, ER + >95%, CO + 65%, HER2 negative. Final pathology yielded invasive ductal carcinoma and focal ductal carcinoma in situ, 1.8 cm, negative margins, 0/2 lymph nodes. Mammaprint Low Risk, Luminal A-Type. She completed radiation and started Anastrozole. She self-discontinued Anastrozole due to vertigo in 2024. She elected for enhanced screening. She presents today for annual mammogram and exam. She is here with her daughter, Heike. She denies any new masses or lumps. She was started on Fosamax following bone density scan in 2023 demonstrating Osteopenia. She only took for one week and discontinued due to side effects with muscle pain. She was not placed on any other medication for Osteopenia. She still takes Vitamin D and Calcium daily and is very active. She is currently being followed for a right breast non-mass enhancement on MRI noted in 2024.   Stage IA [id number]     BREAST IMAGIN2024 Bilateral screening mammogram, indicates BI-RADS Category 2.     REPRODUCTIVE HISTORY: menarche age 13, , first birth age 22,  x 8 months, no OCP's, natural menopause age 53, no HRT, heterogeneously dense tissue    FAMILY CANCER HISTORY:   Father: Lung cancer, age 58  Mother: Lymphoma, age 58     Surgical History  She has a past surgical history that includes Breast biopsy (11/10/2021); Tonsillectomy (2021); Colonoscopy (2021);  section, classic (11/10/2021); Breast lumpectomy (Left, );  section, low transverse (); Breast cyst " aspiration (2021); and Breast cyst excision (2021).     Social History  She reports that she has never smoked. She has never used smokeless tobacco. She reports that she does not currently use alcohol after a past usage of about 2.0 standard drinks of alcohol per week. She reports that she does not use drugs.    Family History  Family History   Problem Relation Name Age of Onset    Cancer Mother Mimi coughlin     Cancer Father Abbe coughlin         Allergies  Nitrofurantoin macrocrystal, Cheese, and Nitrofurantoin    Medications  Current Outpatient Medications   Medication Instructions    atorvastatin (LIPITOR) 10 mg, oral, Daily    carBAMazepine (TEGRETOL) 200 mg, oral, 2 times daily    cephalexin (Keflex) 500 mg capsule 1 capsule daily any day that you swim    cholecalciferol (Vitamin D-3) 25 MCG (1000 UT) capsule Take by mouth.    estradiol (ESTRACE) 1 g, vaginal, 2 times weekly    vitamin E 45 mg (100 unit) capsule Take by mouth.         REVIEW OF SYSTEMS    Constitutional:  Negative for appetite change, fatigue, fever and unexpected weight change.   HENT:  Negative for ear pain, hearing loss, nosebleeds, sore throat and trouble swallowing.    Eyes:  Negative for discharge, itching and visual disturbance.   Respiratory:  Negative for cough, chest tightness and shortness of breath.    Cardiovascular:  Negative for chest pain, palpitations and leg swelling.   Breast: as indicated in HPI  Gastrointestinal:  Negative for abdominal pain, constipation, diarrhea and nausea.   Endocrine: Negative for cold intolerance and heat intolerance.   Genitourinary:  Negative for dysuria, frequency, hematuria, pelvic pain and vaginal bleeding.   Musculoskeletal:  Negative for arthralgias, back pain, gait problem, joint swelling and myalgias.   Skin:  Negative for color change and rash.   Allergic/Immunologic: Negative for environmental allergies and food allergies.   Neurological:  Negative for dizziness, tremors, speech  difficulty, weakness, numbness and headaches.   Hematological:  Does not bruise/bleed easily.   Psychiatric/Behavioral:  Negative for agitation, dysphoric mood and sleep disturbance. The patient is not nervous/anxious.         Past Medical History  She has a past medical history of Breast cancer, Other conditions influencing health status, Other conditions influencing health status, Personal history of irradiation, and Personal history of urinary (tract) infections (11/10/2021).     Physical Exam  Patient is alert and oriented x3 and in a relaxed and appropriate mood. Her gait is steady and hand grasps are equal. Sclera is clear. The breasts are nearly symmetrical. The tissue is soft without palpable abnormalities, discrete nodules or masses. The left breast has a well-healed partial mastectomy incision, central lateral quadrant. The left axilla has a well-healed biopsy incision. The skin and nipples appear normal. There is no cervical, supraclavicular or axillary lymphadenopathy. Heart rate and rhythm normal, S1 and S2 appreciated. The lungs are clear to auscultation bilaterally.       Physical Exam  Chest:              Last Recorded Vitals  Vitals:    04/14/25 0849   BP: 142/86   Pulse: 64   Temp: 36.6 °C (97.9 °F)   SpO2: 97%         Relevant Results   Time was spent viewing digital images of the radiology testing with the patient, results pending    Assessment/Plan   Normal clinical exam, screening mammogram, history of left breast cancer, no family history of breast cancer, Anastrozole therapy discontinued, heterogeneously dense tissue    Plan: Return in one year for bilateral screening mammogram and office visit. Proceed to MRI as scheduled.     Patient Discussion/Summary  Your clinical examination is normal. Please return in one year for bilateral screening mammogram and office visit or sooner if you have any problems or concerns.     You can see your health information, review clinical summaries from office  visits & test results online when you follow your health with MY  Chart, a personal health record. To sign up go to [email or web address]. If you need assistance with signing up or trouble getting into your account call [facility] Patient Line 24/7 at [phone].    My office phone number is [phone] if you need to get in touch with me or have additional questions or concerns. Thank you for choosing [facility] and trusting me as your healthcare provider. I look forward to seeing you again at your next office visit. I am honored to be a provider on your health care team and I remain dedicated to helping you achieve your health goals.      Heike Madrid, APRN-CNP

## 2025-04-14 ENCOUNTER — HOSPITAL ENCOUNTER (OUTPATIENT)
Dept: RADIOLOGY | Facility: CLINIC | Age: 75
Discharge: HOME | End: 2025-04-14
Payer: MEDICARE

## 2025-04-14 ENCOUNTER — OFFICE VISIT (OUTPATIENT)
Dept: SURGICAL ONCOLOGY | Facility: CLINIC | Age: 75
End: 2025-04-14
Payer: MEDICARE

## 2025-04-14 VITALS — WEIGHT: 120.59 LBS | HEIGHT: 65 IN | BODY MASS INDEX: 20.09 KG/M2

## 2025-04-14 VITALS
HEART RATE: 64 BPM | OXYGEN SATURATION: 97 % | TEMPERATURE: 97.9 F | SYSTOLIC BLOOD PRESSURE: 142 MMHG | DIASTOLIC BLOOD PRESSURE: 86 MMHG | WEIGHT: 123.4 LBS | BODY MASS INDEX: 20.31 KG/M2

## 2025-04-14 DIAGNOSIS — Z12.31 ENCOUNTER FOR SCREENING MAMMOGRAM FOR MALIGNANT NEOPLASM OF BREAST: ICD-10-CM

## 2025-04-14 DIAGNOSIS — Z85.3 ENCOUNTER FOR FOLLOW-UP SURVEILLANCE OF BREAST CANCER: ICD-10-CM

## 2025-04-14 DIAGNOSIS — Z08 ENCOUNTER FOR FOLLOW-UP SURVEILLANCE OF BREAST CANCER: ICD-10-CM

## 2025-04-14 DIAGNOSIS — Z08 ENCOUNTER FOR FOLLOW-UP SURVEILLANCE OF BREAST CANCER: Primary | ICD-10-CM

## 2025-04-14 DIAGNOSIS — Z85.3 ENCOUNTER FOR FOLLOW-UP SURVEILLANCE OF BREAST CANCER: Primary | ICD-10-CM

## 2025-04-14 PROCEDURE — 1159F MED LIST DOCD IN RCRD: CPT | Performed by: NURSE PRACTITIONER

## 2025-04-14 PROCEDURE — 1036F TOBACCO NON-USER: CPT | Performed by: NURSE PRACTITIONER

## 2025-04-14 PROCEDURE — 77063 BREAST TOMOSYNTHESIS BI: CPT | Performed by: RADIOLOGY

## 2025-04-14 PROCEDURE — 1126F AMNT PAIN NOTED NONE PRSNT: CPT | Performed by: NURSE PRACTITIONER

## 2025-04-14 PROCEDURE — 77067 SCR MAMMO BI INCL CAD: CPT

## 2025-04-14 PROCEDURE — 99213 OFFICE O/P EST LOW 20 MIN: CPT | Performed by: NURSE PRACTITIONER

## 2025-04-14 PROCEDURE — 1157F ADVNC CARE PLAN IN RCRD: CPT | Performed by: NURSE PRACTITIONER

## 2025-04-14 PROCEDURE — 77067 SCR MAMMO BI INCL CAD: CPT | Performed by: RADIOLOGY

## 2025-04-14 ASSESSMENT — PAIN SCALES - GENERAL: PAINLEVEL_OUTOF10: 0-NO PAIN

## 2025-04-14 ASSESSMENT — PATIENT HEALTH QUESTIONNAIRE - PHQ9
SUM OF ALL RESPONSES TO PHQ9 QUESTIONS 1 & 2: 0
2. FEELING DOWN, DEPRESSED OR HOPELESS: NOT AT ALL
1. LITTLE INTEREST OR PLEASURE IN DOING THINGS: NOT AT ALL

## 2025-04-14 NOTE — PATIENT INSTRUCTIONS
Your clinical examination is normal. Please return in one year for bilateral screening mammogram and office visit or sooner if you have any problems or concerns.     You can see your health information, review clinical summaries from office visits & test results online when you follow your health with MY  Chart, a personal health record. To sign up go to [email or web address]. If you need assistance with signing up or trouble getting into your account call [facility] Patient Line 24/7 at [phone].    My office phone number is [phone] if you need to get in touch with me or have additional questions or concerns. Thank you for choosing [facility] and trusting me as your healthcare provider. I look forward to seeing you again at your next office visit. I am honored to be a provider on your health care team and I remain dedicated to helping you achieve your health goals.

## 2025-05-07 ENCOUNTER — OFFICE VISIT (OUTPATIENT)
Dept: NEUROLOGY | Facility: HOSPITAL | Age: 75
End: 2025-05-07
Payer: MEDICARE

## 2025-05-07 VITALS
RESPIRATION RATE: 18 BRPM | DIASTOLIC BLOOD PRESSURE: 88 MMHG | BODY MASS INDEX: 19.83 KG/M2 | TEMPERATURE: 97.2 F | HEIGHT: 65 IN | SYSTOLIC BLOOD PRESSURE: 143 MMHG | HEART RATE: 63 BPM | WEIGHT: 119 LBS

## 2025-05-07 DIAGNOSIS — G50.0 TRIGEMINAL NEURALGIA OF RIGHT SIDE OF FACE: Primary | ICD-10-CM

## 2025-05-07 ASSESSMENT — PAIN SCALES - GENERAL: PAINLEVEL_OUTOF10: 0-NO PAIN

## 2025-05-07 NOTE — PROGRESS NOTES
"Subjective     Chief Complaint: Headache    Paulina (\"Mary\") Eamon is a very pleasant 74 year old Ashkenazi [employer] female with a hx of trigeminal neuralgia, recurrent UTI, invasive ductal carcinoma s/p partial mastectomy and radiation, HLD, HTN and fibromyalgia presenting for management of facial pain.    HPI  The patient reports that her symptoms initially started in 2021 when she awoke 1 night with bad piercing jaw pain on the right side of her face.  The pain is described as severe 9 out of 10 in severity nonradiating sharp pain always on the right side of her face between the V2 and V3 distribution, typically lasting between 30 seconds and 3 minutes in duration.  She has noticed cold foods, drinks with ice and it, and cold weather are a trigger for her pain.  She initially went to her dentist and had a thorough examination done apparently ruling out temporomandibular joint syndrome.  She has seen several doctors for these complaints both formally and informally with friends in her [place].  MRI imaging with and without contrast in addition to vessel imaging was completed in May 2024 which demonstrated no acute infarct, hemorrhage, masses; incidentally found 3 mm focus of enhancement in the dorsal left asher with associated GRE signal reflective of small vascular malformation such as cavernoma or telangiectasia.  She followed once with neurosurgery for this finding but no intervention was required.    She reports that the pain worsened over the course of several years and her primary care physician diagnosed her with trigeminal neuralgia and prescribed her carbamazepine 200 mg twice daily.  She has been taking this once daily at night and her symptoms have completely resolved.  She initially felt dizzy but is no longer having any side effects.  Denies any other numbness, tingling, weakness anywhere else in her body.  No associated eye redness, tearing, nasal drainage, headache, nausea, vomiting, photophobia or " "phonophobia.      ROS: As per HPI, otherwise all other systems have been reviewed are negative for complaint.     Review of Systems    Medical History[1]  Surgical History[2]  Family History[3]  Social History     Tobacco Use    Smoking status: Never    Smokeless tobacco: Never   Substance Use Topics    Alcohol use: Not Currently     Alcohol/week: 2.0 standard drinks of alcohol     Types: 2 Glasses of wine per week        Objective   /88   Pulse 63   Temp 36.2 °C (97.2 °F)   Resp 18   Ht 1.651 m (5' 5\")   Wt 54 kg (119 lb)   BMI 19.80 kg/m²     General Appearance: No distress, alert, interactive, and cooperative. Orientation was normal to time, place and person. Recent and remote memory was intact.  No obvious cavities, bleeding or lesions seen on teeth or gums inside mouth.    CRANIAL NERVES:   CN 2         Visual fields full to confrontation.   CN 3, 4, 6   Pupils round, 4 mm in diameter, equally reactive to light. Lids symmetric; no ptosis. EOMs normal alignment, full range with normal saccades, pursuit and convergence. No nystagmus.   CN 5   Facial sensation intact bilaterally to light touch, temperature, and pinprick.  No tenderness to palpation or reproducibility of her pain.  CN 7   Normal and symmetric facial strength. Nasolabial folds symmetric.   CN 8   Hearing intact to conversation and finger rub.  CN 9, 10   Palate elevates symmetrically.  CN 11   Normal strength of shoulder shrug and neck turning.   CN 12   Tongue midline, with normal bulk and strength; no fasciculations.     MOTOR:   Muscle bulk and tone were normal in both upper and lower extremities.   No pronator drift bilaterally.  No fasciculations, tremor or other abnormal movements evident with the patient examined clothed.    STRENGTH:  R  L  Deltoid             5          5  Biceps  5 5  Triceps  5 5    Hip flexion  5 5  Knee Flex  5 5  Knee Ex  5 5    REFLEXES:  R L  Biceps  2 2                     Triceps  2 2  Patellar   2 2 "     SENSORY:   In both upper and lower extremities, sensation was intact to light touch.    COORDINATION:   In both upper extremities, finger-nose-finger was intact without dysmetria or overshoot.     GAIT:   Station was stable with a normal base. Gait was stable with a normal arm swing and speed. No ataxia, shuffling, steppage or waddling was present. No circumduction was present. No Romberg sign was present.    Neurological Exam  Physical Exam      Assessment/Plan   74 year old Ashkenazi [employer] female with a hx of trigeminal neuralgia, recurrent UTI, invasive ductal carcinoma s/p partial mastectomy and radiation, HLD, HTN and fibromyalgia presenting for management of facial pain.  Her symptoms represent trigeminal neuralgia.  Given adequate control of pain on carbamazepine 200 mg nightly, we will make no changes to her treatment.    - Baseline CBC and CMP to monitor for agranulocytosis and hyponatremia  - Continue carbamazepine 200 mg nightly  - Return to clinic in 6 months    Jean Carlos Kong MD  PGY2 Neurology    Patient discussed with attending physician Dr. Blake who agrees with plan.         [1]   Past Medical History:  Diagnosis Date    Breast cancer     Other conditions influencing health status     Urinary Tract Infection    Other conditions influencing health status     Foot pain, unspecified laterality    Personal history of irradiation     Personal history of urinary (tract) infections 11/10/2021    History of urinary tract infection   [2]   Past Surgical History:  Procedure Laterality Date    BREAST BIOPSY  11/10/2021    Biopsy Breast Open    BREAST CYST ASPIRATION      BREAST CYST EXCISION      BREAST LUMPECTOMY Left     with RAD     SECTION, CLASSIC  11/10/2021     Section     SECTION, LOW TRANSVERSE  1978    COLONOSCOPY  2021    Complete Colonoscopy    TONSILLECTOMY  2021    Tonsillectomy   [3]   Family History  Problem Relation Name Age of Onset     Cancer Mother Mimi coughlin     Cancer Father Abbe coughlin

## 2025-05-21 ENCOUNTER — HOSPITAL ENCOUNTER (OUTPATIENT)
Dept: RADIOLOGY | Facility: HOSPITAL | Age: 75
Discharge: HOME | End: 2025-05-21
Payer: MEDICARE

## 2025-05-21 DIAGNOSIS — R92.30 DENSE BREAST TISSUE: ICD-10-CM

## 2025-05-21 DIAGNOSIS — Z17.0 MALIGNANT NEOPLASM OF LEFT BREAST IN FEMALE, ESTROGEN RECEPTOR POSITIVE, UNSPECIFIED SITE OF BREAST: ICD-10-CM

## 2025-05-21 DIAGNOSIS — R92.8 ABNORMAL MAGNETIC RESONANCE IMAGING OF RIGHT BREAST: ICD-10-CM

## 2025-05-21 DIAGNOSIS — C50.912 MALIGNANT NEOPLASM OF LEFT BREAST IN FEMALE, ESTROGEN RECEPTOR POSITIVE, UNSPECIFIED SITE OF BREAST: ICD-10-CM

## 2025-05-21 PROCEDURE — 2550000001 HC RX 255 CONTRASTS: Performed by: NURSE PRACTITIONER

## 2025-05-21 PROCEDURE — A9575 INJ GADOTERATE MEGLUMI 0.1ML: HCPCS | Performed by: NURSE PRACTITIONER

## 2025-05-21 PROCEDURE — 77049 MRI BREAST C-+ W/CAD BI: CPT

## 2025-05-21 RX ORDER — GADOTERATE MEGLUMINE 376.9 MG/ML
10 INJECTION INTRAVENOUS
Status: COMPLETED | OUTPATIENT
Start: 2025-05-21 | End: 2025-05-21

## 2025-05-21 RX ADMIN — GADOTERATE MEGLUMINE 10 ML: 376.9 INJECTION INTRAVENOUS at 08:15

## 2025-05-22 ENCOUNTER — TELEPHONE (OUTPATIENT)
Dept: HEMATOLOGY/ONCOLOGY | Facility: HOSPITAL | Age: 75
End: 2025-05-22
Payer: MEDICARE

## 2025-06-05 ENCOUNTER — APPOINTMENT (OUTPATIENT)
Dept: OBSTETRICS AND GYNECOLOGY | Facility: CLINIC | Age: 75
End: 2025-06-05
Payer: MEDICARE

## 2025-06-23 ENCOUNTER — OFFICE VISIT (OUTPATIENT)
Dept: OBSTETRICS AND GYNECOLOGY | Facility: CLINIC | Age: 75
End: 2025-06-23
Payer: MEDICARE

## 2025-06-23 VITALS
DIASTOLIC BLOOD PRESSURE: 75 MMHG | WEIGHT: 123 LBS | BODY MASS INDEX: 20.49 KG/M2 | HEART RATE: 71 BPM | SYSTOLIC BLOOD PRESSURE: 161 MMHG | HEIGHT: 65 IN

## 2025-06-23 DIAGNOSIS — N39.0 RECURRENT UTI: Primary | ICD-10-CM

## 2025-06-23 LAB
POC APPEARANCE, URINE: CLEAR
POC BILIRUBIN, URINE: NEGATIVE
POC BLOOD, URINE: ABNORMAL
POC COLOR, URINE: YELLOW
POC GLUCOSE, URINE: NEGATIVE MG/DL
POC KETONES, URINE: NEGATIVE MG/DL
POC LEUKOCYTES, URINE: NEGATIVE
POC NITRITE,URINE: NEGATIVE
POC PH, URINE: 7 PH
POC PROTEIN, URINE: NEGATIVE MG/DL
POC SPECIFIC GRAVITY, URINE: 1.01
POC UROBILINOGEN, URINE: 0.2 EU/DL

## 2025-06-23 PROCEDURE — 99213 OFFICE O/P EST LOW 20 MIN: CPT | Mod: 25 | Performed by: OBSTETRICS & GYNECOLOGY

## 2025-06-23 PROCEDURE — 99213 OFFICE O/P EST LOW 20 MIN: CPT | Performed by: OBSTETRICS & GYNECOLOGY

## 2025-06-23 PROCEDURE — 51798 US URINE CAPACITY MEASURE: CPT | Performed by: OBSTETRICS & GYNECOLOGY

## 2025-06-23 PROCEDURE — 1159F MED LIST DOCD IN RCRD: CPT | Performed by: OBSTETRICS & GYNECOLOGY

## 2025-06-23 PROCEDURE — 1126F AMNT PAIN NOTED NONE PRSNT: CPT | Performed by: OBSTETRICS & GYNECOLOGY

## 2025-06-23 PROCEDURE — 81003 URINALYSIS AUTO W/O SCOPE: CPT | Mod: QW | Performed by: OBSTETRICS & GYNECOLOGY

## 2025-06-23 ASSESSMENT — ENCOUNTER SYMPTOMS
OCCASIONAL FEELINGS OF UNSTEADINESS: 0
NEUROLOGICAL NEGATIVE: 1
EYES NEGATIVE: 1
GASTROINTESTINAL NEGATIVE: 1
LOSS OF SENSATION IN FEET: 0
MUSCULOSKELETAL NEGATIVE: 1
ENDOCRINE NEGATIVE: 1
RESPIRATORY NEGATIVE: 1
PSYCHIATRIC NEGATIVE: 1
CONSTITUTIONAL NEGATIVE: 1
CARDIOVASCULAR NEGATIVE: 1
DEPRESSION: 0

## 2025-06-23 ASSESSMENT — PAIN SCALES - GENERAL: PAINLEVEL_OUTOF10: 0-NO PAIN

## 2025-06-23 NOTE — PROGRESS NOTES
Urogynecology  Provider:  Emma Quiroz MD  [phone]    ASSESSMENT AND PLAN:   74 year old female with a urethral caruncle, vaginal atrophy, and Kodi. Comorbidities include: Hx of invasive ductal carcinoma of the left breast.     Diagnoses:  #1 Recurrent UTI  #2 Vaginal atrophy   #3 Urethral caruncle     Plan:  1. Kodi, vaginal atrophy, urethral caruncle   - POCT UA with trace intact blood and PVR = 35mL by bladder U/S.  - No recent UTI within the past x12 months.   - Plan to continue Keflex 500 mg po PRN any day that she swims and to restart using transvaginal Estrace cream 2x/week in a more consistent fashion for UTI suppression.   - Upon exam today her urethral caruncle remains stable in size at around 3 mm in diameter. However, we reviewed that using E2 cream more consistently may help reduce the size of her urethral caruncle as this is often associated with hypoestrogenic urethral tissues.     Follow up in 1 year with Dr. Quiroz.     Scribe Attestation  By signing my name below, I, Dilshad Gee, Scribe, attest that this documentation has been prepared under the direction and in the presence of Emma Quiroz MD on 06/23/2025 at 5:38 PM.     Agree with above. I Dr. Quiroz, personally performed the services described in the documentation which was scribed virtually and confirm it is both complete and accurate.  Emma Quiroz MD         Problem List Items Addressed This Visit          Genitourinary and Reproductive    Recurrent UTI - Primary    Relevant Orders    Measure post void residual (Completed)    POCT UA Automated manually resulted (Completed)           I spent a total of eConsult Time: 25 minutes in face to face and non face to face time.        Emma Quiroz MD        HISTORY OF PRESENT ILLNESS:   74 year old female presenting for a 6 month follow up for recurrent UTI.     Records Review:   - Last visit 12/2025   74 y.o. female being assessed for urethral caruncle and  Kodi.     Diagnoses:   #1 Urethral caruncle  #2 Recurrent UTI      Plan:   1. Recurrent UTI   - Sent rx for Keflex 500 mg daily PRN any day that she swims and also a one time 5 day course of Bactrim BID DS in case she should get a UTI while in [place].   - She is overall doing well.   - Continue tv estrogen cream 2x per week. Sent refills to preferred pharmacy.   - All questions answered.      2. Urethral caruncle  - Small and stable on exam, no intervention needed at this time.      Follow-up in 6 months with Dr. Quiroz.      Urinary Symptoms:   - Denies any recent UTI within the past x6 months.   - She has been using transvaginal estrogen cream occasionally and does not use E2 cream consistently 2x/week.    - Patient takes Keflex 500mg PRN when she goes swimming; she does not swim much when in [place] but does go swimming more often when she is in [place].       Past Medical History:    Medical History[1]       Past Surgical History:     Surgical History[2]      Medications:     Prior to Admission medications    Medication Sig Start Date End Date Taking? Authorizing Provider   atorvastatin (Lipitor) 10 mg tablet Take 1 tablet (10 mg) by mouth once daily. 10/29/24   Raúl Granados MD   carBAMazepine (TEGretol) 200 mg tablet Take 1 tablet (200 mg) by mouth 2 times a day. 3/6/25 9/2/25  Raúl Granados MD   cholecalciferol (Vitamin D-3) 25 MCG (1000 UT) capsule Take by mouth.    Historical Provider, MD   estradiol (Estrace) 0.01 % (0.1 mg/gram) vaginal cream Insert 0.25 Applicatorfuls (1 g) into the vagina 2 times a week. 12/5/24   Emma Quiroz MD   vitamin E 45 mg (100 unit) capsule Take by mouth. 4/1/21   Historical Provider, MD LOCK  Review of Systems   Constitutional: Negative.    HENT: Negative.     Eyes: Negative.    Respiratory: Negative.     Cardiovascular: Negative.    Gastrointestinal: Negative.    Endocrine: Negative.    Genitourinary: Negative.    Musculoskeletal: Negative.    Neurological:  "Negative.    Psychiatric/Behavioral: Negative.          Blood, Urine   Date Value Ref Range Status   11/08/2024 NEGATIVE NEGATIVE Final     Poc Nitrite, Urine   Date Value Ref Range Status   06/10/2024 NEGATIVE NEGATIVE Final     Nitrite, Urine   Date Value Ref Range Status   11/08/2024 NEGATIVE NEGATIVE Final     Urobilinogen, Urine   Date Value Ref Range Status   11/08/2024 Normal Normal mg/dL Final         PHYSICAL EXAM:    /75 (Patient Position: Sitting)   Pulse 71   Ht 1.651 m (5' 5\")   Wt 55.8 kg (123 lb)   BMI 20.47 kg/m²   No LMP recorded. Patient is postmenopausal.      Declines chaperone for physical exam.      Well developed, well nourished, in no apparent distress.   Neurologic/Psychiatric:  Awake, Alert and Oriented times 3.  Affect normal.     GENITAL/URINARY:     External Genitalia:  The patient has normal appearing external genitalia, normal skenes and bartholins glands, and a normal hair distribution.  Her vulva is without lesions, erythema or discharge.  It is non-tender with appropriate sensation.     Urethral Meatus:  Size normal, Location normal, Lesions absent. There is a small urethral caruncle present that is around 3 mm in size, stable from prior exam.     Urethra:  Fullness absent, Masses absent.     Bladder:  Fullness absent, Masses absent, Tenderness absent.     Vagina:  General appearance normal, Estrogen effect normal, Discharge absent, Lesions absent. Significant vaginal atrophy. No pelvic organ prolapse.     Cervix: Normal, no discharge.   Uterus:  normal size, mobile, and nontender  Adnexa: normal, no masses or tenderness over the bilateral adnexa      Anus/Perineum:  Lesions absent and Masses absent. Normal appearing anus. Perineum is intact.     Stress urinary incontinence not demonstrable.         POP-Q:  Stage: 0  Position: dorsal lithotomy     Aa: -3       Ba:  C: -9   Gh:  Pb:  TVL: 10         Ap: -2 Bp:  D: -10             Rectum: Normal.        Data and DIAGNOSTIC " STUDIES REVIEWED   Imaging  No results found.    Cardiology, Vascular, and Other Imaging  No other imaging results found for the past 7 days     Lab Results   Component Value Date    URINECULTURE >100,000 Escherichia coli (A) 2024      Lab Results   Component Value Date    GLUCOSE 92 2024    CALCIUM 9.8 2024     2024    K 4.1 2024    CO2 32 2024     2024    BUN 19 2024    CREATININE 0.77 2024     Lab Results   Component Value Date    WBC 6.7 2024    HGB 14.7 2024    HCT 45.2 2024    MCV 97 2024     2024          Emma Quiroz MD             [1]   Past Medical History:  Diagnosis Date    Breast cancer     Other conditions influencing health status     Urinary Tract Infection    Other conditions influencing health status     Foot pain, unspecified laterality    Personal history of irradiation     Personal history of urinary (tract) infections 11/10/2021    History of urinary tract infection   [2]   Past Surgical History:  Procedure Laterality Date    BREAST BIOPSY  11/10/2021    Biopsy Breast Open    BREAST CYST ASPIRATION      BREAST CYST EXCISION      BREAST LUMPECTOMY Left     with RAD     SECTION, CLASSIC  11/10/2021     Section     SECTION, LOW TRANSVERSE      COLONOSCOPY  2021    Complete Colonoscopy    TONSILLECTOMY  2021    Tonsillectomy

## 2025-06-24 ENCOUNTER — TELEPHONE (OUTPATIENT)
Dept: OBSTETRICS AND GYNECOLOGY | Facility: CLINIC | Age: 75
End: 2025-06-24
Payer: MEDICARE

## 2025-07-02 ENCOUNTER — TELEPHONE (OUTPATIENT)
Dept: PRIMARY CARE | Facility: CLINIC | Age: 75
End: 2025-07-02
Payer: MEDICARE

## 2025-07-02 NOTE — TELEPHONE ENCOUNTER
Pt stopped in due to BP and stated that she has been checking at home and has gotten high 170's in the am and today it was 164/92 - [place]

## 2025-07-03 ENCOUNTER — OFFICE VISIT (OUTPATIENT)
Dept: PRIMARY CARE | Facility: CLINIC | Age: 75
End: 2025-07-03
Payer: MEDICARE

## 2025-07-03 VITALS
WEIGHT: 121.6 LBS | BODY MASS INDEX: 19.54 KG/M2 | HEIGHT: 66 IN | SYSTOLIC BLOOD PRESSURE: 148 MMHG | HEART RATE: 61 BPM | DIASTOLIC BLOOD PRESSURE: 88 MMHG | OXYGEN SATURATION: 96 %

## 2025-07-03 DIAGNOSIS — I10 PRIMARY HYPERTENSION: Primary | ICD-10-CM

## 2025-07-03 PROCEDURE — 3079F DIAST BP 80-89 MM HG: CPT | Performed by: INTERNAL MEDICINE

## 2025-07-03 PROCEDURE — 1126F AMNT PAIN NOTED NONE PRSNT: CPT | Performed by: INTERNAL MEDICINE

## 2025-07-03 PROCEDURE — 99214 OFFICE O/P EST MOD 30 MIN: CPT | Performed by: INTERNAL MEDICINE

## 2025-07-03 PROCEDURE — 3077F SYST BP >= 140 MM HG: CPT | Performed by: INTERNAL MEDICINE

## 2025-07-03 PROCEDURE — 1159F MED LIST DOCD IN RCRD: CPT | Performed by: INTERNAL MEDICINE

## 2025-07-03 RX ORDER — LOSARTAN POTASSIUM 50 MG/1
50 TABLET ORAL DAILY
Qty: 30 TABLET | Refills: 3 | Status: SHIPPED | OUTPATIENT
Start: 2025-07-03 | End: 2026-08-07

## 2025-07-03 ASSESSMENT — PAIN SCALES - GENERAL: PAINLEVEL_OUTOF10: 0-NO PAIN

## 2025-07-03 NOTE — PROGRESS NOTES
Patient is here with new onset of hypertension.  Patient is noticed multiple high blood pressure readings over the last couple of months.  She denies any chest pain or shortness of breath and otherwise feels well.  Patient does eat a very high sodium diet but she dines out quite a bit and tends to have a lot of high sodium rich foods in her diet.  She has no family history of hypertension.    Blood pressure is 148/88 today.  Neck shows no carotid bruits  Lungs are clear  Cardiac [place] no murmurs rubs or gallops  No abdominal bruits are noted  No peripheral edema.    New onset essential hypertension.  Patient's blood pressure is mildly elevated.  She has an extremely high sodium rich diet we did have a long conversation about reducing her sodium content.  Patient has not confident that she will be able to do this and would like to try some kind of medication.  Will start her on losartan 50 mg daily and return in 30 days for follow-up.  Also check a CBC chemistries and urinalysis today.

## 2025-07-04 LAB
ALBUMIN SERPL-MCNC: 4.4 G/DL (ref 3.6–5.1)
ALP SERPL-CCNC: 85 U/L (ref 37–153)
ALT SERPL-CCNC: 14 U/L (ref 6–29)
ANION GAP SERPL CALCULATED.4IONS-SCNC: 9 MMOL/L (CALC) (ref 7–17)
APPEARANCE UR: CLEAR
AST SERPL-CCNC: 17 U/L (ref 10–35)
BACTERIA #/AREA URNS HPF: ABNORMAL /HPF
BASOPHILS # BLD AUTO: 52 CELLS/UL (ref 0–200)
BASOPHILS NFR BLD AUTO: 0.7 %
BILIRUB SERPL-MCNC: 0.4 MG/DL (ref 0.2–1.2)
BILIRUB UR QL STRIP: NEGATIVE
BUN SERPL-MCNC: 15 MG/DL (ref 7–25)
CALCIUM SERPL-MCNC: 9.8 MG/DL (ref 8.6–10.4)
CHLORIDE SERPL-SCNC: 103 MMOL/L (ref 98–110)
CO2 SERPL-SCNC: 30 MMOL/L (ref 20–32)
COLOR UR: YELLOW
CREAT SERPL-MCNC: 0.84 MG/DL (ref 0.6–1)
EGFRCR SERPLBLD CKD-EPI 2021: 72 ML/MIN/1.73M2
EOSINOPHIL # BLD AUTO: 118 CELLS/UL (ref 15–500)
EOSINOPHIL NFR BLD AUTO: 1.6 %
ERYTHROCYTE [DISTWIDTH] IN BLOOD BY AUTOMATED COUNT: 12.8 % (ref 11–15)
GLUCOSE SERPL-MCNC: 98 MG/DL (ref 65–99)
GLUCOSE UR QL STRIP: NEGATIVE
HCT VFR BLD AUTO: 44.7 % (ref 35–45)
HGB BLD-MCNC: 14.5 G/DL (ref 11.7–15.5)
HGB UR QL STRIP: NEGATIVE
HYALINE CASTS #/AREA URNS LPF: ABNORMAL /LPF
KETONES UR QL STRIP: NEGATIVE
LEUKOCYTE ESTERASE UR QL STRIP: ABNORMAL
LYMPHOCYTES # BLD AUTO: 999 CELLS/UL (ref 850–3900)
LYMPHOCYTES NFR BLD AUTO: 13.5 %
MCH RBC QN AUTO: 31.7 PG (ref 27–33)
MCHC RBC AUTO-ENTMCNC: 32.4 G/DL (ref 32–36)
MCV RBC AUTO: 97.6 FL (ref 80–100)
MONOCYTES # BLD AUTO: 511 CELLS/UL (ref 200–950)
MONOCYTES NFR BLD AUTO: 6.9 %
NEUTROPHILS # BLD AUTO: 5720 CELLS/UL (ref 1500–7800)
NEUTROPHILS NFR BLD AUTO: 77.3 %
NITRITE UR QL STRIP: NEGATIVE
PH UR STRIP: 6 [PH] (ref 5–8)
PLATELET # BLD AUTO: 195 THOUSAND/UL (ref 140–400)
PMV BLD REES-ECKER: 9.9 FL (ref 7.5–12.5)
POTASSIUM SERPL-SCNC: 4.2 MMOL/L (ref 3.5–5.3)
PROT SERPL-MCNC: 6.3 G/DL (ref 6.1–8.1)
PROT UR QL STRIP: NEGATIVE
RBC # BLD AUTO: 4.58 MILLION/UL (ref 3.8–5.1)
RBC #/AREA URNS HPF: ABNORMAL /HPF
SERVICE CMNT-IMP: ABNORMAL
SODIUM SERPL-SCNC: 142 MMOL/L (ref 135–146)
SP GR UR STRIP: 1.01 (ref 1–1.03)
SQUAMOUS #/AREA URNS HPF: ABNORMAL /HPF
WBC # BLD AUTO: 7.4 THOUSAND/UL (ref 3.8–10.8)
WBC #/AREA URNS HPF: ABNORMAL /HPF

## 2025-07-09 DIAGNOSIS — H91.90 HEARING LOSS, UNSPECIFIED HEARING LOSS TYPE, UNSPECIFIED LATERALITY: ICD-10-CM

## 2025-07-25 ENCOUNTER — CLINICAL SUPPORT (OUTPATIENT)
Dept: AUDIOLOGY | Facility: CLINIC | Age: 75
End: 2025-07-25
Payer: MEDICARE

## 2025-08-04 ENCOUNTER — APPOINTMENT (OUTPATIENT)
Dept: PRIMARY CARE | Facility: CLINIC | Age: 75
End: 2025-08-04
Payer: MEDICARE

## 2025-08-04 VITALS
OXYGEN SATURATION: 98 % | HEIGHT: 65 IN | WEIGHT: 122 LBS | SYSTOLIC BLOOD PRESSURE: 118 MMHG | HEART RATE: 60 BPM | BODY MASS INDEX: 20.33 KG/M2 | DIASTOLIC BLOOD PRESSURE: 78 MMHG

## 2025-08-04 DIAGNOSIS — I10 ESSENTIAL HYPERTENSION: Primary | ICD-10-CM

## 2025-08-04 PROCEDURE — 3074F SYST BP LT 130 MM HG: CPT | Performed by: INTERNAL MEDICINE

## 2025-08-04 PROCEDURE — 99213 OFFICE O/P EST LOW 20 MIN: CPT | Performed by: INTERNAL MEDICINE

## 2025-08-04 PROCEDURE — 1159F MED LIST DOCD IN RCRD: CPT | Performed by: INTERNAL MEDICINE

## 2025-08-04 PROCEDURE — 3078F DIAST BP <80 MM HG: CPT | Performed by: INTERNAL MEDICINE

## 2025-08-04 PROCEDURE — 1126F AMNT PAIN NOTED NONE PRSNT: CPT | Performed by: INTERNAL MEDICINE

## 2025-08-04 RX ORDER — TRIAMCINOLONE ACETONIDE 1 MG/G
CREAM TOPICAL
COMMUNITY
Start: 2025-07-14

## 2025-08-04 ASSESSMENT — PAIN SCALES - GENERAL: PAINLEVEL_OUTOF10: 0-NO PAIN

## 2025-08-04 NOTE — PROGRESS NOTES
Patient is here for follow-up of her hypertension.  Her blood pressure is now well-controlled with numerous readings at home and in the office here.  She is tolerating the losartan without side effects.  We will check repeat CHEM basic today and she will follow-up with me every 6 months or as needed.

## 2025-08-05 ENCOUNTER — APPOINTMENT (OUTPATIENT)
Dept: DERMATOLOGY | Facility: CLINIC | Age: 75
End: 2025-08-05
Payer: MEDICARE

## 2025-08-05 ENCOUNTER — OFFICE VISIT (OUTPATIENT)
Dept: HEMATOLOGY/ONCOLOGY | Facility: CLINIC | Age: 75
End: 2025-08-05
Payer: MEDICARE

## 2025-08-05 VITALS
WEIGHT: 123.57 LBS | SYSTOLIC BLOOD PRESSURE: 145 MMHG | TEMPERATURE: 98.1 F | DIASTOLIC BLOOD PRESSURE: 85 MMHG | BODY MASS INDEX: 20.56 KG/M2 | HEART RATE: 62 BPM

## 2025-08-05 DIAGNOSIS — L57.8 DIFFUSE PHOTODAMAGE OF SKIN: Primary | ICD-10-CM

## 2025-08-05 DIAGNOSIS — M85.80 OSTEOPENIA, UNSPECIFIED LOCATION: ICD-10-CM

## 2025-08-05 DIAGNOSIS — Z98.890 STATUS POST LEFT BREAST LUMPECTOMY: ICD-10-CM

## 2025-08-05 DIAGNOSIS — Z12.83 SCREENING EXAM FOR SKIN CANCER: ICD-10-CM

## 2025-08-05 DIAGNOSIS — Z78.0 MENOPAUSE: ICD-10-CM

## 2025-08-05 DIAGNOSIS — D22.9 MULTIPLE BENIGN NEVI: ICD-10-CM

## 2025-08-05 DIAGNOSIS — L82.1 SEBORRHEIC KERATOSIS: ICD-10-CM

## 2025-08-05 DIAGNOSIS — C50.912 INFILTRATING DUCTAL CARCINOMA OF LEFT BREAST: ICD-10-CM

## 2025-08-05 DIAGNOSIS — L57.0 ACTINIC KERATOSIS: ICD-10-CM

## 2025-08-05 DIAGNOSIS — Z08 ENCOUNTER FOR FOLLOW-UP SURVEILLANCE OF BREAST CANCER: ICD-10-CM

## 2025-08-05 DIAGNOSIS — R92.30 DENSE BREASTS: ICD-10-CM

## 2025-08-05 DIAGNOSIS — D48.5 NEOPLASM OF UNCERTAIN BEHAVIOR OF SKIN: ICD-10-CM

## 2025-08-05 DIAGNOSIS — Z85.3 ENCOUNTER FOR FOLLOW-UP SURVEILLANCE OF BREAST CANCER: ICD-10-CM

## 2025-08-05 DIAGNOSIS — R91.8 ABNORMAL CT SCAN, LUNG: ICD-10-CM

## 2025-08-05 LAB
ANION GAP SERPL CALCULATED.4IONS-SCNC: 10 MMOL/L (CALC) (ref 7–17)
BUN SERPL-MCNC: 20 MG/DL (ref 7–25)
BUN/CREAT SERPL: ABNORMAL (CALC) (ref 6–22)
CALCIUM SERPL-MCNC: 9.2 MG/DL (ref 8.6–10.4)
CHLORIDE SERPL-SCNC: 105 MMOL/L (ref 98–110)
CO2 SERPL-SCNC: 30 MMOL/L (ref 20–32)
CREAT SERPL-MCNC: 0.82 MG/DL (ref 0.6–1)
EGFRCR SERPLBLD CKD-EPI 2021: 74 ML/MIN/1.73M2
GLUCOSE SERPL-MCNC: 154 MG/DL (ref 65–99)
POTASSIUM SERPL-SCNC: 4.2 MMOL/L (ref 3.5–5.3)
SODIUM SERPL-SCNC: 145 MMOL/L (ref 135–146)

## 2025-08-05 PROCEDURE — 1126F AMNT PAIN NOTED NONE PRSNT: CPT | Performed by: NURSE PRACTITIONER

## 2025-08-05 PROCEDURE — 1160F RVW MEDS BY RX/DR IN RCRD: CPT | Performed by: NURSE PRACTITIONER

## 2025-08-05 PROCEDURE — 99215 OFFICE O/P EST HI 40 MIN: CPT | Performed by: NURSE PRACTITIONER

## 2025-08-05 PROCEDURE — 1159F MED LIST DOCD IN RCRD: CPT | Performed by: NURSE PRACTITIONER

## 2025-08-05 ASSESSMENT — DERMATOLOGY PATIENT ASSESSMENT
DO YOU USE SUNSCREEN: DAILY
HAVE YOU HAD OR DO YOU HAVE A STAPH INFECTION: NO
ARE YOU ON BIRTH CONTROL: NO
DO YOU USE A TANNING BED: NO
DO YOU HAVE IRREGULAR MENSTRUAL CYCLES: NO
ARE YOU TRYING TO GET PREGNANT: NO
ARE YOU AN ORGAN TRANSPLANT RECIPIENT: NO
DO YOU HAVE ANY NEW OR CHANGING LESIONS: NO
HAVE YOU HAD OR DO YOU HAVE VASCULAR DISEASE: NO

## 2025-08-05 ASSESSMENT — PAIN SCALES - GENERAL: PAINLEVEL_OUTOF10: 0-NO PAIN

## 2025-08-05 ASSESSMENT — DERMATOLOGY QUALITY OF LIFE (QOL) ASSESSMENT
DATE THE QUALITY-OF-LIFE ASSESSMENT WAS COMPLETED: 67422
RATE HOW BOTHERED YOU ARE BY EFFECTS OF YOUR SKIN PROBLEMS ON YOUR ACTIVITIES (EG, GOING OUT, ACCOMPLISHING WHAT YOU WANT, WORK ACTIVITIES OR YOUR RELATIONSHIPS WITH OTHERS): 0 - NEVER BOTHERED
ARE THERE EXCLUSIONS OR EXCEPTIONS FOR THE QUALITY OF LIFE ASSESSMENT: NO
RATE HOW BOTHERED YOU ARE BY SYMPTOMS OF YOUR SKIN PROBLEM (EG, ITCHING, STINGING BURNING, HURTING OR SKIN IRRITATION): 0 - NEVER BOTHERED
WHAT SINGLE SKIN CONDITION LISTED BELOW IS THE PATIENT ANSWERING THE QUALITY-OF-LIFE ASSESSMENT QUESTIONS ABOUT: NONE OF THE ABOVE
RATE HOW EMOTIONALLY BOTHERED YOU ARE BY YOUR SKIN PROBLEM (FOR EXAMPLE, WORRY, EMBARRASSMENT, FRUSTRATION): 0 - NEVER BOTHERED

## 2025-08-05 ASSESSMENT — ITCH NUMERIC RATING SCALE: HOW SEVERE IS YOUR ITCHING?: 0

## 2025-08-05 ASSESSMENT — PATIENT GLOBAL ASSESSMENT (PGA): PATIENT GLOBAL ASSESSMENT: PATIENT GLOBAL ASSESSMENT:  1 - CLEAR

## 2025-08-05 NOTE — PROGRESS NOTES
Subjective     Paulina Knutson is a 74 y.o. female who presents for the following: Skin Check (FBSE,area of concern abdomen.).     The patient reports that her lesion of concern on her abdomen is a lesion she has had for years and has not changed. She thinks she has been told it is healthy before. She also notes a some redness on her upper lip that her  told her she should have evaluated by her physician. It does not bother her.    Intake Questions  Do you have any new or changing Lesions?: No  Are you an organ transplant recipient?: No  Have you had or do you have a Staph Infection?: No  Have you had or do you have Vacular Disease?: No  Do you use sunscreen?: Daily  Do you use a tanning bed?: No  Are you trying to get pregnant?: No  Are you on birth control?: No  Do you have irregular menstrual cycles?: No    Review of Systems:  No other skin or systemic complaints other than what is documented elsewhere in the note.        Skin Cancer History  Biopsy Log Book  No skin cancers from Specimen Tracking.    Additional History      Specialty Problems          Dermatology Problems    Hemangioma of skin and subcutaneous tissue    Melanocytic nevi of other parts of face    Melanocytic nevi of trunk    Melanocytic nevi of unspecified lower limb, including hip    Melanocytic nevi of unspecified upper limb, including shoulder    Neoplasm of uncertain behavior of skin    Other benign neoplasm of skin of left lower limb, including hip    Other benign neoplasm of skin, unspecified    Other melanin hyperpigmentation    Other seborrheic keratosis    Other specified follicular disorders    Scar condition and fibrosis of skin        Objective   Well appearing patient in no apparent distress; mood and affect are within normal limits.    A full examination was performed including scalp, head, eyes, ears, nose, lips, neck, chest, axillae, abdomen, back, buttocks, bilateral upper extremities, bilateral lower extremities, hands,  feet, fingers, toes, fingernails, and toenails. All findings within normal limits unless otherwise noted below.    Assessment/Plan   Skin Exam  1. DIFFUSE PHOTODAMAGE OF SKIN  Generalized  Hypo-and hyperpigmented macules and erythema in sun-exposed areas  -The signs and symptoms of skin cancer and the ABCDEs of melanoma were reviewed with the patient.  - Wear sunscreen SPF >30, and be sure to re-apply every 1.5-2 hours when outside. Consider wearing wide-brimmed hats or UPF sun protective clothing.  Also consider avoiding the sun during the day when it is most intense, between the hours of 10AM - 2PM.    - Follow Up In Dermatology  2. NEOPLASM OF UNCERTAIN BEHAVIOR OF SKIN  Left Thigh - Posterior  On the left posterior medial thigh, there is a 2mm light and dark brown macule    - Lesion biopsy  Type of biopsy: tangential    Informed consent: discussed and consent obtained    Timeout: patient name, date of birth, surgical site, and procedure verified    Procedure prep:  Patient was prepped and draped  Anesthesia: the lesion was anesthetized in a standard fashion    Anesthetic:  1% lidocaine w/ epinephrine 1-100,000 local infiltration  Instrument used: DermaBlade    Hemostasis achieved with: aluminum chloride    Outcome: patient tolerated procedure well    Post-procedure details: sterile dressing applied and wound care instructions given    Dressing type: petrolatum and bandage      Specimen 1 - Dermatopathology- DERM LAB  Differential Diagnosis: r/o dysplastic nevus  Check Margins Yes/No?:  yes  Comments:    Dermpath Lab: Routine Histopathology (formalin-fixed tissue)  3. ACTINIC KERATOSIS  Right Upper Cutaneous Lip  Erythematous macules with gritty scale.  Reviewed the precancerous nature of this lesion and its relation to sun exposure. Recommend treatment today with liquid nitrogen therapy. After reviewing risks and benefits of liquid nitrogen treatment, the patient gave verbal consent to proceed.  - Destr of  lesion - Right Upper Cutaneous Lip  Complexity: simple    Destruction method: cryotherapy    Informed consent: discussed and consent obtained    Lesion destroyed using liquid nitrogen: Yes    Region frozen until ice ball extended beyond lesion: Yes    Cryotherapy cycles:  1  Outcome: patient tolerated procedure well with no complications    Post-procedure details: wound care instructions given      4. MULTIPLE BENIGN NEVI  Generalized  Light and dark brown small macules  Benign finding; no intervention indicated today.    5. SEBORRHEIC KERATOSIS  Generalized  Waxy, stuck on papules and thin plaques  Benign finding; no intervention indicated today.  6. SCREENING EXAM FOR SKIN CANCER        RTC in 1 year for skin check.    eLlia Quezada MD  PGY-4, Department of Dermatology

## 2025-08-05 NOTE — Clinical Note
Reviewed the precancerous nature of this lesion and its relation to sun exposure. Recommend treatment today with liquid nitrogen therapy. After reviewing risks and benefits of liquid nitrogen treatment, the patient gave verbal consent to proceed.

## 2025-08-05 NOTE — PROGRESS NOTES
Patient ID: Paulina Knutson is a 74 y.o. female.  BREAST CANCER DIAGNOSIS:    AJCC Edition: 8th (AJCC), Diagnosis Date: May 2021, IA, pT1c pN0 cM0 G1     Treatment Synopsis:    -3/24/21: Bilateral screening mammogram revealed a focal asymmetry in the left breast.  -3/25/21: Dx MG/US confirmed a 0.5 x 0.6 x 0.4 cm irregular hypoechoic mass at 3:00, 7 cm FN. A small irregularly hypoechoic mass was seen at 2:00, 7 cm FN, possibly congruent with other lesion. Left axilla appeared unremarkable.  -21: US-guided CNB at 2:00 revealed IDC, grade 1-2, with associated microcalcifications; ER >95%, TN 65%, Her2-negative (1+ IHC). Mammaprint assay low-risk (+0.754), luminal A type.  -21: Left lumpectomy/SLNBx showed 1.8 cm of grade 1 IDC, no LVI, 0/2 SLN’s involved. Margins negative.  -Adjuvant RT 21-21 (5 fractions).  -Adjuvant anastrozole initiated late 2021.  -2024 Self directed completion of anastrozole due to vertigo        Past Medical History: Paulina has a past medical history of Breast cancer, Other conditions influencing health status, Other conditions influencing health status, Personal history of irradiation, and Personal history of urinary (tract) infections (11/10/2021).  Surgical History:  Paulina has a past surgical history that includes Breast biopsy (11/10/2021); Tonsillectomy (2021); Colonoscopy (2021);  section, classic (11/10/2021); Breast lumpectomy (Left, );  section, low transverse (); Breast cyst aspiration (); and Breast cyst excision ().  Social History:  Paulina reports that she has never smoked. She has never used smokeless tobacco. She reports that she does not currently use alcohol after a past usage of about 2.0 standard drinks of alcohol per week. She reports that she does not use drugs.  Social Substance History:  ·  Social History denies smoking, alcohol and drug use   ·  Smoking Status never smoker   ·  Tobacco Use denies    ·  Alcohol Use occasionally, 1 drink every 2 weeks   ·  Drug Use denies   ·  Additional History       GYN History: Menarche age 13. Menopause age 53. , age 22 at first birth. Breastfeeding some. OCP's none, some use of HRT, no use of fertility drugs.    Family History:    Family History   Problem Relation Name Age of Onset    Cancer Mother Mimi coughlin     Cancer Father Abbe coughlin      Family Oncology History:  Cancer-related family history includes Cancer in her father and mother.    HISTORY OF PRESENT ILLNESS:  Paulina Knutson is a 74 y.o. female who returns today for Breast cancer treatment follow-up and surveillance. Today I have reviewed with the patient I will be conducting a clinical physical breast exam. Patient has declined a second medical professional today during the exam as a chapperone.     She is doing well today, denies any new breast cancer concerns. She would like to continue with Breast MRI. Due to travel would like to keep this in the Late Spring / Summer every year.     She recently started new BP medication and is tolerating it well. No sob with daily exercise and denies any issues with cough.      She denies any severe headaches, no vision changes, falls, dizziness or balance. She is due for updated eye exam and Prescription. She recently had hearing retested.      She denies any new or unexplained bone aches or pains.       She denies any skin lesions or masses, oral sores lesions or infections. She continues to follow-up with derm annually for skin checks.      She reports a normal appetite and normal bowel movements, normal urination.     She has baseline sleep issues, is up in the middle of the night. She denies any fatigue despite this, good energy. She does not need naps.      She continues to take to take daily Vit D and Calcium. She is exercising most days with walking, machine weights 3-4 times per week at her gym    Review of Systems  ROS 14 points performed, See HPI for  exceptions      OBJECTIVE:    VS / Pain:  /85 (BP Location: Right arm, Patient Position: Sitting, BP Cuff Size: Small adult)   Pulse 62   Temp 36.7 °C (98.1 °F) (Temporal)   Wt 56.1 kg (123 lb 9.1 oz)   BMI 20.56 kg/m²   BSA: 1.6 meters squared   Pain Scale: 0  ECO- Fully active, able to carry on all pre-disease performance w/o restriction.         Physical Exam  Constitutional: Well developed, awake/alert/oriented x4, no distress, alert and cooperative  EYES: Sclera clear  ENMT: mucous membranes moist, no apparent injury, no lesions seen  Head/Neck: Neck supple, no apparent injury, thyroid without mass or tenderness, No JVD, trachea midline, no bruits  Respiratory / Thoracic: Patent airways, clear to all lobes, normal breath sounds with good chest expansion, thorax symmetric.  Cardiovascular: Regular, rate and rhythm, no murmurs, 2+ equal pulses of the extremities, normal auscultated S 1and S 2  GI: Nondistended, soft, non-tender, no rebound tenderness or guarding, no masses palpable, no organomegaly, +BS, no bruits  Musculoskeletal: ROM intact, no joint swelling, normal strength, no spinal tenderness  Extremities: normal extremities, no cyanosis edema, contusions or wounds, no clubbing  Neurological: alert and oriented x4, intact senses, motor, response and reflexes, normal strength  Breast: Left breast s/p lumpectomy. Bilateral breasts free of any palpable masses or lesions   Lymphatic: No cervical, supraclavicular, infraclavicular or axillary lymphadenopathy  Psychological: Appropriate and talkative mood and behavior  Skin: Warm and dry, no lesions, no rashes, no jaundice      Diagnostic Results   MR breast bilateral w contrast full protocol  Narrative: Interpreted By:  Therese Jeffrey,  and Franco Romo   STUDY:  BI MR BREAST BILATERAL WITH CONTRAST FULL PROTOCOL;  2025 8:20 am      ACCESSION NUMBER(S):  [id number]      ORDERING CLINICIAN:  BETITO ANDRADE      INDICATION:  Short-term  six-month follow-up of probably benign right breast  non-mass enhancement. History of a left lumpectomy with radiation in  2021.      ,C50.912 Malignant neoplasm of unspecified site of left female  breast,Z17.0 Estrogen receptor positive status (ER+),R92.30 Dense  breasts, unspecified,R92.8 Other abnormal and inconclusive findings  on diagnostic imaging of breast      COMPARISON:  Breast MRI dated 10/28/2024. Mammogram dated 04/14/2025, 11/16/2024  and 04/11/2024.      TECHNIQUE:  Using a dedicated breast coil, STIR axial and T1-weighted fat  saturation axial images of the breasts were obtained, the latter both  before and after intravenous administration of Gadolinium DTPA. On an  independent workstation, 3-D images were formulated using [employer]  including time enhancement curves, subtraction images and MIP images.      Intravenous contrast: 10 ML of Dotarem      FINDINGS:  Density: Heterogeneous fibroglandular tissue.      There is asymmetric mild right and minimal left background  enhancement consistent with radiation therapy to the left breast.      RIGHT BREAST:  The focal non-mass enhancement visualized in the  inferolateral breast at posterior depth on prior exam has resolved.  No suspicious mass or nonmass enhancement is identified.      No axillary or internal mammary lymphadenopathy is appreciated.      LEFT BREAST:  There is postsurgical scarring and foci of signal void  from surgical clips seen in the superolateral quadrant at posterior  depth from prior lumpectomy. No suspicious mass or nonmass  enhancement is identified.      There is axillary postoperative scarring and signal void from  surgical clips. No axillary or internal mammary lymphadenopathy is  appreciated.      NON-BREAST FINDINGS:  None.      Impression: 1. Resolved right breast non-mass enhancement. No MRI evidence of  malignancy in the right breast.  2. Benign postlumpectomy changes in the left breast. No MRI evidence  of malignancy in  the left breast.      BI-RADS CATEGORY:  BI-RADS Category:  2 Benign.  Recommendation:  Annual Screening.  Recommended Date:  1 Year.  Laterality:  Bilateral.      For any future breast imaging appointments, please call [phone]).      I personally reviewed the images/study and I agree with the breast  imaging fellow, Demetrius Gamez D.O., findings as stated. This study  was interpreted at [place], [place].      MACRO:  None      Signed by: Therese Jeffrey 5/22/2025 9:18 AM  Dictation workstation:   [id number]     Narrative & Impression   Interpreted By:  Ravin Newberry,   STUDY:  BI MAMMO BILATERAL SCREENING TOMOSYNTHESIS; 4/14/2025 8:29 am      ACCESSION NUMBER(S):  [id number]      ORDERING CLINICIAN:  ANTONELLA FITZGERALD      INDICATION:  Screening.      ,Z08 Encounter for follow-up examination after completed treatment  for malignant neoplasm,Z85.3 Personal history of malignant neoplasm  of breast,Z12.31 Encounter for screening mammogram for malignant  neoplasm of breast      COMPARISON:  11/06/2024, 04/11/2024, 04/10/2023      FINDINGS:  2D and tomosynthesis images were reviewed at 1 mm slice thickness.      Density:  The breasts are heterogeneously dense, which may obscure  small masses.      Postsurgical changes are noted in the left breast. No suspicious  masses or calcifications are identified.      IMPRESSION:  No mammographic evidence of malignancy.      BI-RADS CATEGORY:  BI-RADS Category:  2 Benign.  Recommendation:  Annual Screening.  Recommended Date:  1 Year.  Laterality:  Bilateral.          === 12/05/23 ===    DEXA BONE DENSITY    - Impression -  DEXA:  According to World Health Organization criteria,  classification is low bone mass (osteopenia)    Followup recommended in two years or sooner as clinically warranted.    All images and detailed analysis are available on the  Radiology  PACS.    MACRO:  None    Signed by: Emily Mckeon  12/5/2023 10:58 AM  Dictation workstation:   [id number]    === 11/11/24 ===    CT CHEST W IV CONTRAST    - Impression -  1.  There is no CT finding that explains the presence of the non mass  enhancement described on the recent breast MR I. Recommend  continuation with the recommended follow-up given at the time of that  exam recommending follow-up breast MRI in 6 months.  2. There is a small geographic area of tree-in-bud opacities in the  posterolateral right upper lobe as described above. This is likely  inflammatory/infection of age-indeterminate its in could be related  to atypical mycobacterial infection. Correlate clinically.  3. The gallbladder has a somewhat abnormal appearance on the limits  of the views. Recommend follow-up  right upper quadrant sonogram.    MACRO:  Critical Finding:  See findings. Notification was initiated on  11/13/2024 at 10:11 am by  Joseph Schoenberger.  (**-YCF-**)  Instructions:  US Abdomen Limited. out-patient.    Signed by: Joseph Schoenberger 11/13/2024 10:12 AM  Dictation workstation:   [id number]      Office Visit on 07/03/2025   Component Date Value Ref Range Status    COLOR 07/03/2025 YELLOW  YELLOW Final    APPEARANCE 07/03/2025 CLEAR  CLEAR Final    SPECIFIC GRAVITY 07/03/2025 1.013  1.001 - 1.035 Final    PH 07/03/2025 6.0  5.0 - 8.0 Final    GLUCOSE 07/03/2025 NEGATIVE  NEGATIVE Final    BILIRUBIN 07/03/2025 NEGATIVE  NEGATIVE Final    KETONES 07/03/2025 NEGATIVE  NEGATIVE Final    OCCULT BLOOD 07/03/2025 NEGATIVE  NEGATIVE Final    PROTEIN 07/03/2025 NEGATIVE  NEGATIVE Final    NITRITE 07/03/2025 NEGATIVE  NEGATIVE Final    LEUKOCYTE ESTERASE 07/03/2025 3+ (A)  NEGATIVE Final    WBC 07/03/2025 20-40 (A)  < OR = 5 /HPF Final    RBC 07/03/2025 NONE SEEN  < OR = 2 /HPF Final    SQUAMOUS EPITHELIAL CELLS 07/03/2025 6-10 (A)  < OR = 5 /HPF Final    BACTERIA 07/03/2025 NONE SEEN  NONE SEEN /HPF Final    HYALINE CAST 07/03/2025 0-5 (A)  NONE SEEN /LPF Final    NOTE  07/03/2025    Final    WHITE BLOOD CELL COUNT 07/03/2025 7.4  3.8 - 10.8 Thousand/uL Final    RED BLOOD CELL COUNT 07/03/2025 4.58  3.80 - 5.10 Million/uL Final    HEMOGLOBIN 07/03/2025 14.5  11.7 - 15.5 g/dL Final    HEMATOCRIT 07/03/2025 44.7  35.0 - 45.0 % Final    MCV 07/03/2025 97.6  80.0 - 100.0 fL Final    MCH 07/03/2025 31.7  27.0 - 33.0 pg Final    MCHC 07/03/2025 32.4  32.0 - 36.0 g/dL Final    RDW 07/03/2025 12.8  11.0 - 15.0 % Final    PLATELET COUNT 07/03/2025 195  140 - 400 Thousand/uL Final    MPV 07/03/2025 9.9  7.5 - 12.5 fL Final    ABSOLUTE NEUTROPHILS 07/03/2025 5,720  1,500 - 7,800 cells/uL Final    ABSOLUTE LYMPHOCYTES 07/03/2025 999  850 - 3,900 cells/uL Final    ABSOLUTE MONOCYTES 07/03/2025 511  200 - 950 cells/uL Final    ABSOLUTE EOSINOPHILS 07/03/2025 118  15 - 500 cells/uL Final    ABSOLUTE BASOPHILS 07/03/2025 52  0 - 200 cells/uL Final    NEUTROPHILS 07/03/2025 77.3  % Final    LYMPHOCYTES 07/03/2025 13.5  % Final    MONOCYTES 07/03/2025 6.9  % Final    EOSINOPHILS 07/03/2025 1.6  % Final    BASOPHILS 07/03/2025 0.7  % Final    GLUCOSE 07/03/2025 98  65 - 99 mg/dL Final    UREA NITROGEN (BUN) 07/03/2025 15  7 - 25 mg/dL Final    CREATININE 07/03/2025 0.84  0.60 - 1.00 mg/dL Final    EGFR 07/03/2025 72  > OR = 60 mL/min/1.73m2 Final    SODIUM 07/03/2025 142  135 - 146 mmol/L Final    POTASSIUM 07/03/2025 4.2  3.5 - 5.3 mmol/L Final    CHLORIDE 07/03/2025 103  98 - 110 mmol/L Final    CARBON DIOXIDE 07/03/2025 30  20 - 32 mmol/L Final    ELECTROLYTE BALANCE 07/03/2025 9  7 - 17 mmol/L (calc) Final    CALCIUM 07/03/2025 9.8  8.6 - 10.4 mg/dL Final    PROTEIN, TOTAL 07/03/2025 6.3  6.1 - 8.1 g/dL Final    ALBUMIN 07/03/2025 4.4  3.6 - 5.1 g/dL Final    BILIRUBIN, TOTAL 07/03/2025 0.4  0.2 - 1.2 mg/dL Final    ALKALINE PHOSPHATASE 07/03/2025 85  37 - 153 U/L Final    AST 07/03/2025 17  10 - 35 U/L Final    ALT 07/03/2025 14  6 - 29 U/L Final        Assessment/Plan   Invasive ductal  "carcinoma of breast, Pathologic: Stage IA (pT1c, pN0, cM0, G1, ER+, OK+, HER2-)  Diagnoses and all orders for this visit:  Dense breasts (Primary)  Menopause  Infiltrating ductal carcinoma of left breast  Status post left breast lumpectomy  Encounter for follow-up surveillance of breast cancer      Problem List Items Addressed This Visit       Invasive ductal carcinoma of breast    Status post left breast lumpectomy    Menopause    Encounter for follow-up surveillance of breast cancer    Dense breasts - Primary      1. Left breast cancer. Stage IA (pT1c pN0 cM0), grade 1 IDC, ER/OK+, Her2-negative. She is s/p lumpectomy and SLNBx. Adjuvant RT completed  7/2/21. Anastrozole initiated 7/2021.  - Patient doing well today without any concerning symptoms or exam findings. June 2024 Self directed stop of anastrozole due to concerning symptoms that she worked up with PCP; all testing negative. She is declining any further anti-estrogen therapy and would like \"more frequent surveillance.\" Given her Dense breast tissue and hx of breast cancer, we will continue use of breast MRI in addition to annual mammogram.     CT Nov 2024 with inflammatory findings- will complete final CT Fall 2025 without contrast.       - She will rtc in 1 yr with me, continued shared visits.   - Mammogram due 4/2026 with surgical team.  -Will continue in follow-up with radiation oncology as well     There is no evidence on clinical exam today for breast cancer recurrence      2. Osteopenia  Dexa completed Sept 2021 showing T-score -2.0, Dec 2023 T-score -2.4.  She has declined Bisphosphonate therapy with Zometa. Will manage DEXA testing moving forward with PCP        General Health maintenance  PCP Dr Acosta annually and as needed         At least 33 minutes of direct consultation was spent with the patient today reviewing her cancer care plan, educating and answering questions regarding ongoing follow up, greater than 50% in counseling and " coordination of care.    Treatment Plan:  [No matching plan found]          Thank you for the opportunity to be involved in the care of Paulina Knutson. We discussed the clinical significance of diagnosis, goals of care and treatment plan in detail. Please do not hesitate to reach out with any questions. Thank you.     -------------------------------------------------------------------------------------------------------------------------------  Cara Saldivar MSN, APRN, FNP-C  [facility]  Division of Medical Oncology- Breast   Collaborating Physician Dr. Main Bryant   [facility] [place] [address]  Phone: [phone]  Fax: [phone]  Available via Secure Chat    Confidential Peer Review Document-  Privilege  Privileged Pursuant to [place] Revised Code Section 2305.24, .25, .251 & .252

## 2025-08-05 NOTE — Clinical Note
-The signs and symptoms of skin cancer and the ABCDEs of melanoma were reviewed with the patient.  - Wear sunscreen SPF >30, and be sure to re-apply every 1.5-2 hours when outside. Consider wearing wide-brimmed hats or UPF sun protective clothing.  Also consider avoiding the sun during the day when it is most intense, between the hours of 10AM - 2PM.

## 2025-08-05 NOTE — PATIENT INSTRUCTIONS
Cara DAY, CNP August 2026    Due to inflammation seen in CT of the lung in Nov 2024, we will repeat likely one last time with fall 2025- non contrasted CT of the chest.      Given your decision to stop anastrozole and dense breast tissue we will continue with use of annual breast MRI. Because of your travel schedule we will keep this each year in May / Carin- orders placed for 2026.      Juanis Sorensen CNP radiation oncology  1/16/2026- you may move this around your travel schedule      Ashley Madrid CNP Breast surgery follow-up annually with mammogram same day April 16 2026     PCP Dr Granados annually and as needed.       Dec 2024 Bone Density showed worsening osteopenia.      GYN Dr. Richie Christopher follow-up as scheduled      Call with any questions, concerns or treatment intolerance prior to next office visit [phone]

## 2025-08-11 LAB
LABORATORY COMMENT REPORT: NORMAL
PATH REPORT.FINAL DX SPEC: NORMAL
PATH REPORT.GROSS SPEC: NORMAL
PATH REPORT.MICROSCOPIC SPEC OTHER STN: NORMAL
PATH REPORT.RELEVANT HX SPEC: NORMAL
PATH REPORT.TOTAL CANCER: NORMAL

## 2026-02-09 ENCOUNTER — APPOINTMENT (OUTPATIENT)
Dept: DERMATOLOGY | Facility: CLINIC | Age: 76
End: 2026-02-09
Payer: MEDICARE

## 2026-08-07 ENCOUNTER — APPOINTMENT (OUTPATIENT)
Dept: DERMATOLOGY | Facility: CLINIC | Age: 76
End: 2026-08-07
Payer: MEDICARE